# Patient Record
Sex: FEMALE | Race: ASIAN | NOT HISPANIC OR LATINO
[De-identification: names, ages, dates, MRNs, and addresses within clinical notes are randomized per-mention and may not be internally consistent; named-entity substitution may affect disease eponyms.]

---

## 2017-05-03 ENCOUNTER — APPOINTMENT (OUTPATIENT)
Dept: CARDIOLOGY | Facility: CLINIC | Age: 67
End: 2017-05-03

## 2017-05-03 ENCOUNTER — MEDICATION RENEWAL (OUTPATIENT)
Age: 67
End: 2017-05-03

## 2017-05-03 VITALS
WEIGHT: 112 LBS | TEMPERATURE: 98.2 F | BODY MASS INDEX: 20.61 KG/M2 | RESPIRATION RATE: 18 BRPM | DIASTOLIC BLOOD PRESSURE: 78 MMHG | OXYGEN SATURATION: 95 % | HEART RATE: 65 BPM | SYSTOLIC BLOOD PRESSURE: 117 MMHG

## 2017-06-23 ENCOUNTER — NON-APPOINTMENT (OUTPATIENT)
Age: 67
End: 2017-06-23

## 2017-06-23 ENCOUNTER — APPOINTMENT (OUTPATIENT)
Dept: CARDIOLOGY | Facility: CLINIC | Age: 67
End: 2017-06-23

## 2017-06-23 VITALS
SYSTOLIC BLOOD PRESSURE: 110 MMHG | RESPIRATION RATE: 17 BRPM | DIASTOLIC BLOOD PRESSURE: 69 MMHG | BODY MASS INDEX: 21.16 KG/M2 | WEIGHT: 115 LBS | HEART RATE: 65 BPM | HEIGHT: 61.81 IN | OXYGEN SATURATION: 96 % | TEMPERATURE: 98.9 F

## 2017-06-23 DIAGNOSIS — R07.89 OTHER CHEST PAIN: ICD-10-CM

## 2017-06-23 RX ORDER — ATORVASTATIN CALCIUM 10 MG/1
10 TABLET, FILM COATED ORAL
Qty: 90 | Refills: 0 | Status: COMPLETED | COMMUNITY
Start: 2017-05-12

## 2017-06-23 RX ORDER — RALOXIFENE HYDROCHLORIDE 60 MG/1
60 TABLET, FILM COATED ORAL
Qty: 90 | Refills: 0 | Status: COMPLETED | COMMUNITY
Start: 2017-06-19

## 2017-06-23 RX ORDER — CHLORHEXIDINE GLUCONATE, 0.12% ORAL RINSE 1.2 MG/ML
0.12 SOLUTION DENTAL
Qty: 473 | Refills: 0 | Status: COMPLETED | COMMUNITY
Start: 2017-01-11

## 2017-06-23 RX ORDER — CALCIUM CARBONATE/VITAMIN D3 600 MG-10
600-400 TABLET ORAL
Qty: 60 | Refills: 0 | Status: COMPLETED | COMMUNITY
Start: 2017-05-25

## 2017-06-23 RX ORDER — AMOXICILLIN 500 MG/1
500 CAPSULE ORAL
Qty: 30 | Refills: 0 | Status: COMPLETED | COMMUNITY
Start: 2017-04-18

## 2017-07-05 ENCOUNTER — MEDICATION RENEWAL (OUTPATIENT)
Age: 67
End: 2017-07-05

## 2017-10-12 ENCOUNTER — APPOINTMENT (OUTPATIENT)
Dept: CARDIOLOGY | Facility: CLINIC | Age: 67
End: 2017-10-12
Payer: MEDICARE

## 2017-10-12 VITALS
SYSTOLIC BLOOD PRESSURE: 96 MMHG | DIASTOLIC BLOOD PRESSURE: 64 MMHG | WEIGHT: 114 LBS | TEMPERATURE: 98.4 F | OXYGEN SATURATION: 100 % | RESPIRATION RATE: 17 BRPM | HEART RATE: 62 BPM | BODY MASS INDEX: 20.98 KG/M2

## 2017-10-12 PROCEDURE — 99214 OFFICE O/P EST MOD 30 MIN: CPT

## 2017-10-18 ENCOUNTER — APPOINTMENT (OUTPATIENT)
Dept: CARDIOLOGY | Facility: CLINIC | Age: 67
End: 2017-10-18

## 2018-01-12 ENCOUNTER — APPOINTMENT (OUTPATIENT)
Dept: CARDIOLOGY | Facility: CLINIC | Age: 68
End: 2018-01-12

## 2018-02-14 ENCOUNTER — APPOINTMENT (OUTPATIENT)
Dept: CARDIOLOGY | Facility: CLINIC | Age: 68
End: 2018-02-14
Payer: MEDICARE

## 2018-02-14 VITALS
WEIGHT: 114 LBS | SYSTOLIC BLOOD PRESSURE: 97 MMHG | OXYGEN SATURATION: 97 % | BODY MASS INDEX: 20.98 KG/M2 | HEART RATE: 78 BPM | RESPIRATION RATE: 17 BRPM | DIASTOLIC BLOOD PRESSURE: 66 MMHG | TEMPERATURE: 98 F

## 2018-02-14 PROCEDURE — 93015 CV STRESS TEST SUPVJ I&R: CPT

## 2018-02-14 PROCEDURE — 99214 OFFICE O/P EST MOD 30 MIN: CPT | Mod: 25

## 2018-02-14 PROCEDURE — 93306 TTE W/DOPPLER COMPLETE: CPT

## 2018-08-21 ENCOUNTER — RX RENEWAL (OUTPATIENT)
Age: 68
End: 2018-08-21

## 2018-08-28 ENCOUNTER — APPOINTMENT (OUTPATIENT)
Dept: CARDIOLOGY | Facility: CLINIC | Age: 68
End: 2018-08-28
Payer: MEDICARE

## 2018-08-28 VITALS
TEMPERATURE: 98 F | WEIGHT: 112 LBS | HEART RATE: 87 BPM | RESPIRATION RATE: 17 BRPM | DIASTOLIC BLOOD PRESSURE: 67 MMHG | OXYGEN SATURATION: 96 % | BODY MASS INDEX: 20.61 KG/M2 | SYSTOLIC BLOOD PRESSURE: 99 MMHG

## 2018-08-28 PROCEDURE — 99214 OFFICE O/P EST MOD 30 MIN: CPT

## 2019-02-28 ENCOUNTER — APPOINTMENT (OUTPATIENT)
Dept: CARDIOLOGY | Facility: CLINIC | Age: 69
End: 2019-02-28

## 2019-04-19 ENCOUNTER — APPOINTMENT (OUTPATIENT)
Dept: CARDIOLOGY | Facility: CLINIC | Age: 69
End: 2019-04-19
Payer: MEDICARE

## 2019-04-19 VITALS
SYSTOLIC BLOOD PRESSURE: 117 MMHG | OXYGEN SATURATION: 98 % | HEART RATE: 72 BPM | BODY MASS INDEX: 20.61 KG/M2 | RESPIRATION RATE: 18 BRPM | DIASTOLIC BLOOD PRESSURE: 75 MMHG | TEMPERATURE: 98 F | WEIGHT: 112 LBS

## 2019-04-19 PROCEDURE — 99214 OFFICE O/P EST MOD 30 MIN: CPT

## 2019-09-05 ENCOUNTER — APPOINTMENT (OUTPATIENT)
Dept: CARDIOLOGY | Facility: CLINIC | Age: 69
End: 2019-09-05
Payer: MEDICARE

## 2019-09-05 ENCOUNTER — NON-APPOINTMENT (OUTPATIENT)
Age: 69
End: 2019-09-05

## 2019-09-05 VITALS
BODY MASS INDEX: 20.98 KG/M2 | DIASTOLIC BLOOD PRESSURE: 70 MMHG | OXYGEN SATURATION: 96 % | TEMPERATURE: 98.8 F | RESPIRATION RATE: 17 BRPM | HEART RATE: 85 BPM | SYSTOLIC BLOOD PRESSURE: 110 MMHG | WEIGHT: 114 LBS

## 2019-09-05 PROCEDURE — 93000 ELECTROCARDIOGRAM COMPLETE: CPT

## 2019-09-05 PROCEDURE — 99214 OFFICE O/P EST MOD 30 MIN: CPT

## 2019-09-26 ENCOUNTER — NON-APPOINTMENT (OUTPATIENT)
Age: 69
End: 2019-09-26

## 2019-09-26 ENCOUNTER — APPOINTMENT (OUTPATIENT)
Dept: CARDIOLOGY | Facility: CLINIC | Age: 69
End: 2019-09-26
Payer: MEDICARE

## 2019-09-26 VITALS
SYSTOLIC BLOOD PRESSURE: 98 MMHG | BODY MASS INDEX: 20.61 KG/M2 | DIASTOLIC BLOOD PRESSURE: 63 MMHG | OXYGEN SATURATION: 96 % | HEART RATE: 57 BPM | WEIGHT: 112 LBS | TEMPERATURE: 98.3 F | RESPIRATION RATE: 17 BRPM

## 2019-09-26 DIAGNOSIS — I48.92 UNSPECIFIED ATRIAL FLUTTER: ICD-10-CM

## 2019-09-26 PROCEDURE — 99213 OFFICE O/P EST LOW 20 MIN: CPT

## 2019-10-30 ENCOUNTER — APPOINTMENT (OUTPATIENT)
Dept: HEART AND VASCULAR | Facility: CLINIC | Age: 69
End: 2019-10-30
Payer: MEDICARE

## 2019-10-30 ENCOUNTER — NON-APPOINTMENT (OUTPATIENT)
Age: 69
End: 2019-10-30

## 2019-10-30 VITALS
BODY MASS INDEX: 20.61 KG/M2 | DIASTOLIC BLOOD PRESSURE: 56 MMHG | SYSTOLIC BLOOD PRESSURE: 110 MMHG | WEIGHT: 112 LBS | HEIGHT: 62 IN | HEART RATE: 71 BPM

## 2019-10-30 PROCEDURE — 93000 ELECTROCARDIOGRAM COMPLETE: CPT

## 2019-10-30 PROCEDURE — 99205 OFFICE O/P NEW HI 60 MIN: CPT | Mod: 25

## 2019-10-30 RX ORDER — PROPRANOLOL HYDROCHLORIDE 10 MG/1
10 TABLET ORAL DAILY
Qty: 90 | Refills: 1 | Status: DISCONTINUED | COMMUNITY
Start: 2017-05-03 | End: 2019-10-30

## 2019-10-31 ENCOUNTER — FORM ENCOUNTER (OUTPATIENT)
Age: 69
End: 2019-10-31

## 2019-11-01 ENCOUNTER — APPOINTMENT (OUTPATIENT)
Dept: CT IMAGING | Facility: HOSPITAL | Age: 69
End: 2019-11-01

## 2019-11-01 ENCOUNTER — OUTPATIENT (OUTPATIENT)
Dept: OUTPATIENT SERVICES | Facility: HOSPITAL | Age: 69
LOS: 1 days | Discharge: HOME | End: 2019-11-01
Payer: MEDICARE

## 2019-11-01 LAB
ALBUMIN SERPL ELPH-MCNC: 4.4 G/DL — SIGNIFICANT CHANGE UP (ref 3.3–5)
ALP SERPL-CCNC: 59 U/L — SIGNIFICANT CHANGE UP (ref 40–120)
ALT FLD-CCNC: 21 U/L — SIGNIFICANT CHANGE UP (ref 10–45)
ANION GAP SERPL CALC-SCNC: 11 MMOL/L — SIGNIFICANT CHANGE UP (ref 5–17)
APTT BLD: 57.9 SEC — HIGH (ref 27.5–36.3)
AST SERPL-CCNC: 21 U/L — SIGNIFICANT CHANGE UP (ref 10–40)
BILIRUB SERPL-MCNC: 0.8 MG/DL — SIGNIFICANT CHANGE UP (ref 0.2–1.2)
BUN SERPL-MCNC: 16 MG/DL — SIGNIFICANT CHANGE UP (ref 7–23)
CALCIUM SERPL-MCNC: 9.3 MG/DL — SIGNIFICANT CHANGE UP (ref 8.4–10.5)
CHLORIDE SERPL-SCNC: 105 MMOL/L — SIGNIFICANT CHANGE UP (ref 96–108)
CO2 SERPL-SCNC: 25 MMOL/L — SIGNIFICANT CHANGE UP (ref 22–31)
CREAT SERPL-MCNC: 0.69 MG/DL — SIGNIFICANT CHANGE UP (ref 0.5–1.3)
GLUCOSE SERPL-MCNC: 95 MG/DL — SIGNIFICANT CHANGE UP (ref 70–99)
HCT VFR BLD CALC: 43.4 % — SIGNIFICANT CHANGE UP (ref 34.5–45)
HGB BLD-MCNC: 14 G/DL — SIGNIFICANT CHANGE UP (ref 11.5–15.5)
INR BLD: 1.53 — HIGH (ref 0.88–1.16)
MCHC RBC-ENTMCNC: 29.1 PG — SIGNIFICANT CHANGE UP (ref 27–34)
MCHC RBC-ENTMCNC: 32.3 GM/DL — SIGNIFICANT CHANGE UP (ref 32–36)
MCV RBC AUTO: 90.2 FL — SIGNIFICANT CHANGE UP (ref 80–100)
NRBC # BLD: 0 /100 WBCS — SIGNIFICANT CHANGE UP (ref 0–0)
PLATELET # BLD AUTO: 243 K/UL — SIGNIFICANT CHANGE UP (ref 150–400)
POTASSIUM SERPL-MCNC: 4.3 MMOL/L — SIGNIFICANT CHANGE UP (ref 3.5–5.3)
POTASSIUM SERPL-SCNC: 4.3 MMOL/L — SIGNIFICANT CHANGE UP (ref 3.5–5.3)
PROT SERPL-MCNC: 8 G/DL — SIGNIFICANT CHANGE UP (ref 6–8.3)
PROTHROM AB SERPL-ACNC: 17.5 SEC — HIGH (ref 10–12.9)
RBC # BLD: 4.81 M/UL — SIGNIFICANT CHANGE UP (ref 3.8–5.2)
RBC # FLD: 13.2 % — SIGNIFICANT CHANGE UP (ref 10.3–14.5)
SODIUM SERPL-SCNC: 141 MMOL/L — SIGNIFICANT CHANGE UP (ref 135–145)
WBC # BLD: 10.71 K/UL — HIGH (ref 3.8–10.5)
WBC # FLD AUTO: 10.71 K/UL — HIGH (ref 3.8–10.5)

## 2019-11-01 PROCEDURE — 80053 COMPREHEN METABOLIC PANEL: CPT

## 2019-11-01 PROCEDURE — 93312 ECHO TRANSESOPHAGEAL: CPT

## 2019-11-01 PROCEDURE — 92960 CARDIOVERSION ELECTRIC EXT: CPT

## 2019-11-01 PROCEDURE — 93320 DOPPLER ECHO COMPLETE: CPT | Mod: 26

## 2019-11-01 PROCEDURE — 85027 COMPLETE CBC AUTOMATED: CPT

## 2019-11-01 PROCEDURE — 93325 DOPPLER ECHO COLOR FLOW MAPG: CPT | Mod: 26

## 2019-11-01 PROCEDURE — 85730 THROMBOPLASTIN TIME PARTIAL: CPT

## 2019-11-01 PROCEDURE — 75572 CT HRT W/3D IMAGE: CPT

## 2019-11-01 PROCEDURE — 85610 PROTHROMBIN TIME: CPT

## 2019-11-01 PROCEDURE — 93306 TTE W/DOPPLER COMPLETE: CPT

## 2019-11-01 PROCEDURE — 93306 TTE W/DOPPLER COMPLETE: CPT | Mod: 26

## 2019-11-01 PROCEDURE — 76376 3D RENDER W/INTRP POSTPROCES: CPT | Mod: 26

## 2019-11-01 PROCEDURE — 75572 CT HRT W/3D IMAGE: CPT | Mod: 26

## 2019-11-01 PROCEDURE — 93312 ECHO TRANSESOPHAGEAL: CPT | Mod: 26

## 2019-11-05 ENCOUNTER — APPOINTMENT (OUTPATIENT)
Dept: CARDIOLOGY | Facility: CLINIC | Age: 69
End: 2019-11-05
Payer: MEDICARE

## 2019-11-05 ENCOUNTER — NON-APPOINTMENT (OUTPATIENT)
Age: 69
End: 2019-11-05

## 2019-11-05 VITALS
TEMPERATURE: 97.8 F | DIASTOLIC BLOOD PRESSURE: 79 MMHG | SYSTOLIC BLOOD PRESSURE: 120 MMHG | OXYGEN SATURATION: 96 % | RESPIRATION RATE: 17 BRPM | WEIGHT: 108 LBS | HEART RATE: 77 BPM | BODY MASS INDEX: 19.75 KG/M2

## 2019-11-05 PROCEDURE — 99213 OFFICE O/P EST LOW 20 MIN: CPT

## 2019-11-06 NOTE — DISCUSSION/SUMMARY
[FreeTextEntry1] : Ms. Gómez is a 69 year old female with atrial fibrillation (failed Multaq), who presents for initial evaluation.  WE discussed in great detail what atrial fibrillation is, the association with stroke and the natural progression of this arrhythmia.  She has symptoms from atrial fibrillation and is currently in afib despite being on Multaq.  Unclear if she is on low dose or full dose eliquis but we discussed she should be on 5mg BID.  I have recommended she proceeds with a cardioversion.  She will need a GABBY prior if she is not on 5mg BID and the dose will need to be adjusted.  Post cardioversion we will get a CT EP planning.  We discussed the option of long term arrhythmia suppression with an alternative antiarrhythmic medication vs. an ablation.  We discussed what an ablation is including risks, benefits and alternatives.   I have quoted her an approximately 70% chance for success and a 1:700 chance of major complication related to the procedure.  Risks including, but not limited to; infection, vascular injury, cardiac perforation, TE/CVA, phrenic nerve injury were discussed.  All questions answered.  We will see how she feels post cardioversion, but I suspect she would benefit from an ablation.  She will call us regarding her Eliquis dosing and we will plan for cardioversion +/- GABBY.  She knows to call with any questions or concerns.  \par

## 2019-11-06 NOTE — REVIEW OF SYSTEMS
[Fever] : no fever [Chills] : no chills [Feeling Fatigued] : not feeling fatigued [see HPI] : see HPI [Shortness Of Breath] : no shortness of breath [Chest Pain] : no chest pain [Negative] : Heme/Lymph

## 2019-11-06 NOTE — HISTORY OF PRESENT ILLNESS
[FreeTextEntry1] : Ms. Gómez is a 69 year old female with atrial fibrillation (failed Multaq), who presents for initial evaluation.\par \par She has had paroxysmal atrial fibrillation for a few years.  She was started on Multaq but recently had palpitations and increased fatigue.  She is unclear what dose of Eliquis she is on but our records say 2.5mg.  No chest pain, SOB, syncope, near syncope.  She has never had a cardioversion or an ablation.

## 2019-11-06 NOTE — PHYSICAL EXAM
[General Appearance - Well Developed] : well developed [Normal Appearance] : normal appearance [Well Groomed] : well groomed [General Appearance - Well Nourished] : well nourished [No Deformities] : no deformities [General Appearance - In No Acute Distress] : no acute distress [Normal Conjunctiva] : the conjunctiva exhibited no abnormalities [Normal Oral Mucosa] : normal oral mucosa [Normal Jugular Venous V Waves Present] : normal jugular venous V waves present [5th Left ICS - MCL] : palpated at the 5th LICS in the midclavicular line [Normal] : normal [Normal Rate] : normal [Irregularly Irregular] : irregularly irregular [No Murmur] : no murmurs heard [No Pitting Edema] : no pitting edema present [] : no respiratory distress [Respiration, Rhythm And Depth] : normal respiratory rhythm and effort [Exaggerated Use Of Accessory Muscles For Inspiration] : no accessory muscle use [Auscultation Breath Sounds / Voice Sounds] : lungs were clear to auscultation bilaterally [Abnormal Walk] : normal gait [Cyanosis, Localized] : no localized cyanosis [Skin Color & Pigmentation] : normal skin color and pigmentation [Oriented To Time, Place, And Person] : oriented to person, place, and time [Impaired Insight] : insight and judgment were intact [Affect] : the affect was normal [Mood] : the mood was normal [No Anxiety] : not feeling anxious

## 2019-11-26 ENCOUNTER — INPATIENT (INPATIENT)
Facility: HOSPITAL | Age: 69
LOS: 0 days | Discharge: HOME | DRG: 274 | End: 2019-11-27
Attending: INTERNAL MEDICINE | Admitting: INTERNAL MEDICINE
Payer: MEDICARE

## 2019-11-26 VITALS
OXYGEN SATURATION: 94 % | DIASTOLIC BLOOD PRESSURE: 67 MMHG | RESPIRATION RATE: 5 BRPM | SYSTOLIC BLOOD PRESSURE: 113 MMHG | HEART RATE: 88 BPM

## 2019-11-26 DIAGNOSIS — R63.8 OTHER SYMPTOMS AND SIGNS CONCERNING FOOD AND FLUID INTAKE: ICD-10-CM

## 2019-11-26 DIAGNOSIS — I48.91 UNSPECIFIED ATRIAL FIBRILLATION: ICD-10-CM

## 2019-11-26 LAB
ALBUMIN SERPL ELPH-MCNC: 4.9 G/DL — SIGNIFICANT CHANGE UP (ref 3.3–5)
ALP SERPL-CCNC: 55 U/L — SIGNIFICANT CHANGE UP (ref 40–120)
ALT FLD-CCNC: 18 U/L — SIGNIFICANT CHANGE UP (ref 10–45)
ANION GAP SERPL CALC-SCNC: 9 MMOL/L — SIGNIFICANT CHANGE UP (ref 5–17)
APTT BLD: 59.7 SEC — HIGH (ref 27.5–36.3)
AST SERPL-CCNC: 22 U/L — SIGNIFICANT CHANGE UP (ref 10–40)
BILIRUB SERPL-MCNC: 0.8 MG/DL — SIGNIFICANT CHANGE UP (ref 0.2–1.2)
BLD GP AB SCN SERPL QL: NEGATIVE — SIGNIFICANT CHANGE UP
BUN SERPL-MCNC: 12 MG/DL — SIGNIFICANT CHANGE UP (ref 7–23)
CALCIUM SERPL-MCNC: 9 MG/DL — SIGNIFICANT CHANGE UP (ref 8.4–10.5)
CHLORIDE SERPL-SCNC: 107 MMOL/L — SIGNIFICANT CHANGE UP (ref 96–108)
CO2 SERPL-SCNC: 27 MMOL/L — SIGNIFICANT CHANGE UP (ref 22–31)
CREAT SERPL-MCNC: 0.71 MG/DL — SIGNIFICANT CHANGE UP (ref 0.5–1.3)
GLUCOSE SERPL-MCNC: 93 MG/DL — SIGNIFICANT CHANGE UP (ref 70–99)
HCT VFR BLD CALC: 47.1 % — HIGH (ref 34.5–45)
HGB BLD-MCNC: 14.4 G/DL — SIGNIFICANT CHANGE UP (ref 11.5–15.5)
INR BLD: 1.09 — SIGNIFICANT CHANGE UP (ref 0.88–1.16)
MAGNESIUM SERPL-MCNC: 2.4 MG/DL — SIGNIFICANT CHANGE UP (ref 1.6–2.6)
MCHC RBC-ENTMCNC: 28.7 PG — SIGNIFICANT CHANGE UP (ref 27–34)
MCHC RBC-ENTMCNC: 30.6 GM/DL — LOW (ref 32–36)
MCV RBC AUTO: 93.8 FL — SIGNIFICANT CHANGE UP (ref 80–100)
NRBC # BLD: 0 /100 WBCS — SIGNIFICANT CHANGE UP (ref 0–0)
PLATELET # BLD AUTO: 253 K/UL — SIGNIFICANT CHANGE UP (ref 150–400)
POTASSIUM SERPL-MCNC: 4.7 MMOL/L — SIGNIFICANT CHANGE UP (ref 3.5–5.3)
POTASSIUM SERPL-SCNC: 4.7 MMOL/L — SIGNIFICANT CHANGE UP (ref 3.5–5.3)
PROT SERPL-MCNC: 8.2 G/DL — SIGNIFICANT CHANGE UP (ref 6–8.3)
PROTHROM AB SERPL-ACNC: 12.4 SEC — SIGNIFICANT CHANGE UP (ref 10–12.9)
RBC # BLD: 5.02 M/UL — SIGNIFICANT CHANGE UP (ref 3.8–5.2)
RBC # FLD: 13.6 % — SIGNIFICANT CHANGE UP (ref 10.3–14.5)
RH IG SCN BLD-IMP: POSITIVE — SIGNIFICANT CHANGE UP
SODIUM SERPL-SCNC: 143 MMOL/L — SIGNIFICANT CHANGE UP (ref 135–145)
WBC # BLD: 6.07 K/UL — SIGNIFICANT CHANGE UP (ref 3.8–10.5)
WBC # FLD AUTO: 6.07 K/UL — SIGNIFICANT CHANGE UP (ref 3.8–10.5)

## 2019-11-26 PROCEDURE — 93623 PRGRMD STIMJ&PACG IV RX NFS: CPT | Mod: 26

## 2019-11-26 PROCEDURE — 93613 INTRACARDIAC EPHYS 3D MAPG: CPT

## 2019-11-26 PROCEDURE — 93662 INTRACARDIAC ECG (ICE): CPT | Mod: 26

## 2019-11-26 PROCEDURE — 93656 COMPRE EP EVAL ABLTJ ATR FIB: CPT

## 2019-11-26 RX ORDER — APIXABAN 2.5 MG/1
5 TABLET, FILM COATED ORAL
Refills: 0 | Status: DISCONTINUED | OUTPATIENT
Start: 2019-11-26 | End: 2019-11-27

## 2019-11-26 RX ORDER — APIXABAN 2.5 MG/1
1 TABLET, FILM COATED ORAL
Qty: 0 | Refills: 0 | DISCHARGE

## 2019-11-26 RX ORDER — DRONEDARONE 400 MG/1
1 TABLET, FILM COATED ORAL
Qty: 0 | Refills: 0 | DISCHARGE

## 2019-11-26 RX ORDER — COLCHICINE 0.6 MG
0.6 TABLET ORAL DAILY
Refills: 0 | Status: DISCONTINUED | OUTPATIENT
Start: 2019-11-26 | End: 2019-11-27

## 2019-11-26 RX ORDER — METOPROLOL TARTRATE 50 MG
1 TABLET ORAL
Qty: 0 | Refills: 0 | DISCHARGE

## 2019-11-26 RX ORDER — SUCRALFATE 1 G
1 TABLET ORAL
Refills: 0 | Status: DISCONTINUED | OUTPATIENT
Start: 2019-11-26 | End: 2019-11-27

## 2019-11-26 RX ORDER — ACETAMINOPHEN 500 MG
650 TABLET ORAL ONCE
Refills: 0 | Status: DISCONTINUED | OUTPATIENT
Start: 2019-11-26 | End: 2019-11-27

## 2019-11-26 RX ORDER — METOPROLOL TARTRATE 50 MG
25 TABLET ORAL ONCE
Refills: 0 | Status: DISCONTINUED | OUTPATIENT
Start: 2019-11-26 | End: 2019-11-27

## 2019-11-26 NOTE — H&P ADULT - NSHPLABSRESULTS_GEN_ALL_CORE
EK19 afib with a ventricular rate of 57     Echo: 2018, normal LV function, no pulmonary hypertension, normal LA size, mild mitral regurgitation LVEF 70%.     TTE 2019    1. Normal left and right ventricular s   2. Mildly dilated left atrium.   3. Mildly dilated right atrium.    4. Mild tricuspid regurgitation.    5. Mild pulmonary hypertension, PASP is 41.7 mmHg.   6. No pericardial effusion.

## 2019-11-26 NOTE — H&P ADULT - HISTORY OF PRESENT ILLNESS
Ms. Gómez is a 69-year-old female PMHx atrial fibrillation (failed Multaq), who presents for ablation of atrial fibrillation.     Briefly, she has had paroxysmal atrial fibrillation for a few years. She was started on Multaq but recently had palpitations and increased fatigue. She is on 5mg Eliquis. No chest pain, SOB, syncope, near syncope. EK19 afib with a ventricular rate of 57 Echo: 2018, normal LV function, no pulmonary hypertension, normal LA size, mild mitral regurgitation LVEF 70%.     She reports no changes in her clinical statis since follow up visit and has continued medication regimen as prescribed. She is stable.

## 2019-11-26 NOTE — H&P ADULT - ASSESSMENT
69-year-old female PMHx atrial fibrillation (failed Multaq), who presents for ablation of atrial fibrillation. Currently stable.

## 2019-11-26 NOTE — PROGRESS NOTE ADULT - SUBJECTIVE AND OBJECTIVE BOX
EPS Post-Procedure Note    S/p successful pulmonary vein isolation for atrial fibrillation  RF Balloon  Bilateral femoral vein access  General anesthesia  Patient tolerated procedure well without complication    Plan:  - Bedrest x 6 hours  - Resume home dose of AC 6 hours post-procedure  - Groin checks as per protocol  - Likely discharge home tomorrow

## 2019-11-26 NOTE — H&P ADULT - PROBLEM SELECTOR PLAN 1
s/p balloon ablation  -post procedure groin checks  -place diet once on floor  -bedrest 6 hours post  -continue Eliquis 11pm.  -continue Metoprolol   -discharge TTE (ordered for Wednesday)

## 2019-11-26 NOTE — H&P ADULT - NSHPPHYSICALEXAM_GEN_ALL_CORE
Awake, talkative, in NAD  AXOX3, follows commands, appropriate  Neck supple, no JVD noted  PEERL, nasal/buccal mucosa moist and well perfused  BS clear bilaterally, unlabored, symmetrical  S1/S2, no S3, RRR, no M/G/R noted  Abdomen soft, non tender, non distended  No edema noted, perfusion brisk  Pulses palpable throughout  Skin warm and dry, no rashes/lesions noted

## 2019-11-26 NOTE — H&P ADULT - NSHPREVIEWOFSYSTEMS_GEN_ALL_CORE
GENERAL, CONSTITUTIONAL : denies recent weight loss, fever, chills  EYES, VISION: denies changes in vision   EARS, NOSE, THROAT: denies hearing loss  HEART, CARDIOVASCULAR: denies chest pain, arrhythmia, palpitations, SOB,  LE edema, claudication  RESPIRATORY: Denies cough, SOB, wheezing, PND, orthopnea  GASTROINTESTINAL: Denies abdominal pain, epigastric pain, nausea, vomiting, or hematemesis, diarrhea, constipation, melena or hematochezia.  GENITOURINARY: Denies frequent urination, urgency  MUSCULOSKELETAL denies joint pain or swelling, restricted motion, musculoskeletal pain.   SKIN & INTEGUMENTARY Denies rashes, sores, blisters, blisters, growths.  NEUROLOGICAL: Denies numbness or tingling sensations, sensation loss, burning.   PSYCHIATRIC: Denies nervousness, anxiety, depression  ENDOCRINE Denies heat or cold intolerance, excessive thirst  HEMATOLOGIC/LYMPHATIC: Denies abnormal bleeding, bleeding of any kind

## 2019-11-27 ENCOUNTER — TRANSCRIPTION ENCOUNTER (OUTPATIENT)
Age: 69
End: 2019-11-27

## 2019-11-27 VITALS — TEMPERATURE: 98 F

## 2019-11-27 LAB
EXTRA GREEN TOP TUBE: SIGNIFICANT CHANGE UP
EXTRA LAVENDER TOP TUBE: SIGNIFICANT CHANGE UP
HCV AB S/CO SERPL IA: 0.21 S/CO — SIGNIFICANT CHANGE UP
HCV AB SERPL-IMP: SIGNIFICANT CHANGE UP

## 2019-11-27 PROCEDURE — 99238 HOSP IP/OBS DSCHRG MGMT 30/<: CPT

## 2019-11-27 PROCEDURE — 93306 TTE W/DOPPLER COMPLETE: CPT | Mod: 26

## 2019-11-27 RX ORDER — COLCHICINE 0.6 MG
1 TABLET ORAL
Qty: 7 | Refills: 0
Start: 2019-11-27 | End: 2019-12-03

## 2019-11-27 RX ORDER — SUCRALFATE 1 G
1 TABLET ORAL
Qty: 30 | Refills: 0
Start: 2019-11-27 | End: 2019-12-26

## 2019-11-27 RX ORDER — PANTOPRAZOLE SODIUM 20 MG/1
1 TABLET, DELAYED RELEASE ORAL
Qty: 30 | Refills: 0
Start: 2019-11-27 | End: 2019-12-26

## 2019-11-27 RX ADMIN — Medication 1 GRAM(S): at 00:30

## 2019-11-27 RX ADMIN — Medication 1 GRAM(S): at 06:02

## 2019-11-27 RX ADMIN — APIXABAN 5 MILLIGRAM(S): 2.5 TABLET, FILM COATED ORAL at 00:30

## 2019-11-27 NOTE — DISCHARGE NOTE NURSING/CASE MANAGEMENT/SOCIAL WORK - NSDCFUADDAPPT_GEN_ALL_CORE_FT
Please call the office in [place] to make a follow up appointment with Dr. Negro:    [phone] [place] [id number]  Tel [phone]

## 2019-11-27 NOTE — DISCHARGE NOTE PROVIDER - CARE PROVIDER_API CALL
Garrison Negro)  Cardiac Electrophysiology; Cardiology; Cardiovascular Disease  [address], [address] [place] [address]  Phone: [phone]  Fax: [phone]  Follow Up Time:

## 2019-11-27 NOTE — DISCHARGE NOTE PROVIDER - HOSPITAL COURSE
Ms. Gómez is a 69-year-old female PMHx atrial fibrillation (failed Multaq), who presented for ablation of atrial fibrillation with RF balloon.         Briefly, she has had paroxysmal atrial fibrillation for a few years. She was started on Multaq but recently had palpitations and increased fatigue. She is on 5mg Eliquis. No chest pain, SOB, syncope, near syncope. EK19 afib with a ventricular rate of 57 Echo: 2018, normal LV function, no pulmonary hypertension, normal LA size, mild mitral regurgitation LVEF 70%.         She is s/p successful ablation. No acute events overnight. Cleared for discharge home today with her family.         Discharge Physical Exam:         TELEMETRY:        VITAL SIGNS:    T(C): 36.2 (19 @ 05:20), Max: 36.3 (19 @ 22:34)    HR: 78 (19 @ 05:08) (76 - 88)    BP: 101/60 (19 @ 05:08) (94/56 - 115/74)    RR: 17 (19 @ 05:08) (5 - 18)    SpO2: 96% (19 @ 05:08) (94% - 96%)        PHYSICAL EXAM:    Awake, talkative, in NAD    AXOX3, follows commands, appropriate    Neck supple, no JVD noted    PEERL, nasal/buccal mucosa moist and well perfused    BS clear bilaterally, unlabored, symmetrical    S1/S2, no S3, RRR, no M/G/R noted    Abdomen soft, non tender, non distended    No edema noted, perfusion brisk    Pulses palpable throughout    Skin warm and dry, no rashes/lesions noted    R/L groin C/D/I, no hematoma/bleeding/oozing/bruit noted. Perfusion and sensation intact. Ms. Gómez is a 69-year-old female PMHx atrial fibrillation (failed Multaq), who presented for ablation of atrial fibrillation with RF balloon.         Briefly, she has had paroxysmal atrial fibrillation for a few years. She was started on Multaq but recently had palpitations and increased fatigue. She is on 5mg Eliquis. No chest pain, SOB, syncope, near syncope. EK19 afib with a ventricular rate of 57 Echo: 2018, normal LV function, no pulmonary hypertension, normal LA size, mild mitral regurgitation LVEF 70%.         She is s/p successful ablation. No acute events overnight. Cleared for discharge home today with her family.         Discharge Physical Exam:         TELEMETRY: NSR 70's        VITAL SIGNS:    T(C): 36.2 (19 @ 05:20), Max: 36.3 (19 @ 22:34)    HR: 78 (19 @ 05:08) (76 - 88)    BP: 101/60 (19 @ 05:08) (94/56 - 115/74)    RR: 17 (19 @ 05:08) (5 - 18)    SpO2: 96% (19 @ 05:08) (94% - 96%)        PHYSICAL EXAM:    Awake, talkative, in NAD    AXOX3, follows commands, appropriate    Neck supple, no JVD noted    PEERL, nasal/buccal mucosa moist and well perfused    BS clear bilaterally, unlabored, symmetrical    S1/S2, no S3, RRR, no M/G/R noted    Abdomen soft, non tender, non distended    No edema noted, perfusion brisk    Pulses palpable throughout    Skin warm and dry, no rashes/lesions noted    R/L groin C/D/I, no hematoma/bleeding/oozing/bruit noted. Perfusion and sensation intact.

## 2019-11-27 NOTE — DISCHARGE NOTE PROVIDER - NSDCCPCAREPLAN_GEN_ALL_CORE_FT
PRINCIPAL DISCHARGE DIAGNOSIS  Diagnosis: Atrial fibrillation, unspecified type  Assessment and Plan of Treatment: You had a successful ablation using a radiofrequency balloon. continue medications as prescribed. You are discharged with three extra medications to help prevent post-procedure inflammation. Take colchicine one a day for 1 week - stop if you experience loose stools. Take Carafate once a day in the morning on a completely empty stomach for 30 days, and protonix once a day for 30 days. Follow up with Dr. Negro as directed.

## 2019-11-27 NOTE — DISCHARGE NOTE PROVIDER - NSDCFUADDINST_GEN_ALL_CORE_FT
-Ok to shower today. Let the soapy water run over the puncture sites but do not scrub the area for a few days.   -Pat groins dry after showering  -You may drive in three days.   -Monitor groins for any swelling/bruising/tenderness/bleeding and call the office if this happens or you have any general concerns.

## 2019-11-27 NOTE — DISCHARGE NOTE NURSING/CASE MANAGEMENT/SOCIAL WORK - PATIENT PORTAL LINK FT
You can access the FollowMyHealth Patient Portal offered by [facility] by registering at the following website: [email or web address]. By joining [facility]’s FollowMyHealth portal, you will also be able to view your health information using other applications (apps) compatible with our system.

## 2019-11-27 NOTE — DISCHARGE NOTE PROVIDER - NSDCFUADDAPPT_GEN_ALL_CORE_FT
Your follow up appointment with Dr. Negro is on Please call the office in [place] to make a follow up appointment with Dr. Negro:    [phone] [place] [phone]  Tel [phone]

## 2019-11-27 NOTE — DISCHARGE NOTE PROVIDER - INSTRUCTIONS
Please notify Niranjan Olivares We recommend eating a diet of fresh vegetables and fruits, lean meats, whole grains, and water for hydration. Cook with olive or canola oil as they are most heart healthy and avoid excessive use of butters/creams/saturated fats. Limit red meat to once a week.    Limit salt intake on a daily basis. Salt is hidden in many foods including fast food, processed foods, and deli foods and deli meats.

## 2019-11-27 NOTE — DISCHARGE NOTE PROVIDER - NSDCMRMEDTOKEN_GEN_ALL_CORE_FT
colchicine 0.6 mg oral tablet: 1 tab(s) orally once a day  Eliquis 5 mg oral tablet: 1 tab(s) orally 2 times a day  metoprolol succinate 25 mg oral tablet, extended release: 1 tab(s) orally once a day  Multaq 400 mg oral tablet: 1 tab(s) orally 2 times a day  Protonix 40 mg oral delayed release tablet: 1 tab(s) orally once a day   sucralfate 1 g oral tablet: 1 tab(s) orally once a day

## 2019-12-02 PROBLEM — I48.91 UNSPECIFIED ATRIAL FIBRILLATION: Chronic | Status: ACTIVE | Noted: 2019-11-26

## 2019-12-04 DIAGNOSIS — Z79.01 LONG TERM (CURRENT) USE OF ANTICOAGULANTS: ICD-10-CM

## 2019-12-04 DIAGNOSIS — I27.20 PULMONARY HYPERTENSION, UNSPECIFIED: ICD-10-CM

## 2019-12-04 DIAGNOSIS — I48.0 PAROXYSMAL ATRIAL FIBRILLATION: ICD-10-CM

## 2019-12-04 DIAGNOSIS — Z82.49 FAMILY HISTORY OF ISCHEMIC HEART DISEASE AND OTHER DISEASES OF THE CIRCULATORY SYSTEM: ICD-10-CM

## 2019-12-06 ENCOUNTER — APPOINTMENT (OUTPATIENT)
Dept: CARDIOLOGY | Facility: CLINIC | Age: 69
End: 2019-12-06
Payer: MEDICARE

## 2019-12-06 VITALS
TEMPERATURE: 98.1 F | DIASTOLIC BLOOD PRESSURE: 77 MMHG | RESPIRATION RATE: 18 BRPM | SYSTOLIC BLOOD PRESSURE: 121 MMHG | HEART RATE: 77 BPM | OXYGEN SATURATION: 96 % | BODY MASS INDEX: 20.12 KG/M2 | WEIGHT: 110 LBS

## 2019-12-06 PROCEDURE — 99213 OFFICE O/P EST LOW 20 MIN: CPT

## 2019-12-06 NOTE — PHYSICAL EXAM
[General Appearance - Well Developed] : well developed [Normal Appearance] : normal appearance [Well Groomed] : well groomed [General Appearance - Well Nourished] : well nourished [No Deformities] : no deformities [General Appearance - In No Acute Distress] : no acute distress [Normal Conjunctiva] : the conjunctiva exhibited no abnormalities [Eyelids - No Xanthelasma] : the eyelids demonstrated no xanthelasmas [Normal Oral Mucosa] : normal oral mucosa [No Oral Pallor] : no oral pallor [No Oral Cyanosis] : no oral cyanosis [Normal Jugular Venous A Waves Present] : normal jugular venous A waves present [Normal Jugular Venous V Waves Present] : normal jugular venous V waves present [No Jugular Venous Elizabeth A Waves] : no jugular venous elizabeth A waves [Respiration, Rhythm And Depth] : normal respiratory rhythm and effort [Exaggerated Use Of Accessory Muscles For Inspiration] : no accessory muscle use [Auscultation Breath Sounds / Voice Sounds] : lungs were clear to auscultation bilaterally [Heart Sounds] : normal S1 and S2 [Murmurs] : no murmurs present [Arterial Pulses Normal] : the arterial pulses were normal [Edema] : no peripheral edema present [Regular-Premature Beats] : the rhythm was regular with premature beats [Abdomen Soft] : soft [Abdomen Tenderness] : non-tender [Abdomen Mass (___ Cm)] : no abdominal mass palpated [Abnormal Walk] : normal gait [Gait - Sufficient For Exercise Testing] : the gait was sufficient for exercise testing [Nail Clubbing] : no clubbing of the fingernails [Cyanosis, Localized] : no localized cyanosis [Petechial Hemorrhages (___cm)] : no petechial hemorrhages [] : no ischemic changes [Oriented To Time, Place, And Person] : oriented to person, place, and time [Affect] : the affect was normal [Mood] : the mood was normal [No Anxiety] : not feeling anxious [Heart Rate And Rhythm] : heart rate and rhythm were normal

## 2019-12-06 NOTE — HISTORY OF PRESENT ILLNESS
[FreeTextEntry1] : 68-year-old female with PAF and HLD presents for followup.  Patient was last seen on 8/28/18.  Patient returned today for followup.  She is on Eliquis 2.5 mg BID for stroke prevention.  She takes Metoprolol ER 25 mg daily and Propafenone 225 mg BID for AFIB suppression. Patient reports that she has palpitations and when she was in [facility] the cardiologist there recommend that she get an ablation. She had Holter and Echo done while there. She reports that she feels fine on medication and her palpitations happens only when she feels nervous or under pressure. \par

## 2019-12-06 NOTE — PHYSICAL EXAM
[General Appearance - Well Developed] : well developed [Well Groomed] : well groomed [Normal Appearance] : normal appearance [General Appearance - Well Nourished] : well nourished [No Deformities] : no deformities [General Appearance - In No Acute Distress] : no acute distress [Normal Conjunctiva] : the conjunctiva exhibited no abnormalities [Normal Oral Mucosa] : normal oral mucosa [Eyelids - No Xanthelasma] : the eyelids demonstrated no xanthelasmas [No Oral Pallor] : no oral pallor [No Oral Cyanosis] : no oral cyanosis [Normal Jugular Venous A Waves Present] : normal jugular venous A waves present [Normal Jugular Venous V Waves Present] : normal jugular venous V waves present [No Jugular Venous Elizabeth A Waves] : no jugular venous elizabeth A waves [Respiration, Rhythm And Depth] : normal respiratory rhythm and effort [Exaggerated Use Of Accessory Muscles For Inspiration] : no accessory muscle use [Auscultation Breath Sounds / Voice Sounds] : lungs were clear to auscultation bilaterally [Heart Rate And Rhythm] : heart rate and rhythm were normal [Heart Sounds] : normal S1 and S2 [Murmurs] : no murmurs present [Arterial Pulses Normal] : the arterial pulses were normal [Edema] : no peripheral edema present [Regular-Premature Beats] : the rhythm was regular with premature beats [Abdomen Soft] : soft [Abdomen Tenderness] : non-tender [Abdomen Mass (___ Cm)] : no abdominal mass palpated [Abnormal Walk] : normal gait [Gait - Sufficient For Exercise Testing] : the gait was sufficient for exercise testing [Nail Clubbing] : no clubbing of the fingernails [Cyanosis, Localized] : no localized cyanosis [Petechial Hemorrhages (___cm)] : no petechial hemorrhages [] : no ischemic changes [Oriented To Time, Place, And Person] : oriented to person, place, and time [Affect] : the affect was normal [Mood] : the mood was normal [No Anxiety] : not feeling anxious

## 2019-12-06 NOTE — DISCUSSION/SUMMARY
[FreeTextEntry1] : 68-year-old female with PAF and HLD presents for followup.  Patient was last seen on 8/28/18.  Patient returned today for followup.  She is on Eliquis 2.5 mg BID for stroke prevention.  She takes Metoprolol ER 25 mg daily and Propafenone 225 mg BID for AFIB suppression. Patient reports that she has palpitations and when she was in [place] the cardiologist there recommend that she get an ablation. She had Holter and Echo done while there. She reports that she feels fine on medication and her palpitations happens only when she feels nervous or under pressure. \par  \par (1) Paroxysmal atrial flutter/fibrillation - Patient is mildly symptomatic.  Her [id number] score is 2 (age 65-75, female gender).  She should continue Eliquis 2.5 mg BID for stroke prevention.  She should continue Metoprolol ER 25 mg daily and Propafenone 225 mg BID AFIB suppression.  She can consider ablation or switching to another anti-arrhythmic drug if she remains symptomatic.\par \par (2) Mild CAD - Patient reports that she had a CTA in [place] which showed mild CAD (<50% stenosis).  She is asymptomatic.  She is on statin therapy.\par \par (3) Followup -  6 months.

## 2019-12-06 NOTE — PHYSICAL EXAM
[General Appearance - Well Developed] : well developed [Normal Appearance] : normal appearance [Well Groomed] : well groomed [General Appearance - Well Nourished] : well nourished [No Deformities] : no deformities [Normal Conjunctiva] : the conjunctiva exhibited no abnormalities [General Appearance - In No Acute Distress] : no acute distress [Eyelids - No Xanthelasma] : the eyelids demonstrated no xanthelasmas [Normal Oral Mucosa] : normal oral mucosa [No Oral Pallor] : no oral pallor [No Oral Cyanosis] : no oral cyanosis [Normal Jugular Venous A Waves Present] : normal jugular venous A waves present [Normal Jugular Venous V Waves Present] : normal jugular venous V waves present [No Jugular Venous Elizabeth A Waves] : no jugular venous elizabeth A waves [Respiration, Rhythm And Depth] : normal respiratory rhythm and effort [Auscultation Breath Sounds / Voice Sounds] : lungs were clear to auscultation bilaterally [Exaggerated Use Of Accessory Muscles For Inspiration] : no accessory muscle use [Murmurs] : no murmurs present [Heart Sounds] : normal S1 and S2 [Arterial Pulses Normal] : the arterial pulses were normal [Regular-Premature Beats] : the rhythm was regular with premature beats [Edema] : no peripheral edema present [Abdomen Tenderness] : non-tender [Abdomen Soft] : soft [Abdomen Mass (___ Cm)] : no abdominal mass palpated [Abnormal Walk] : normal gait [Nail Clubbing] : no clubbing of the fingernails [Gait - Sufficient For Exercise Testing] : the gait was sufficient for exercise testing [Cyanosis, Localized] : no localized cyanosis [Petechial Hemorrhages (___cm)] : no petechial hemorrhages [] : no ischemic changes [Oriented To Time, Place, And Person] : oriented to person, place, and time [Mood] : the mood was normal [Affect] : the affect was normal [No Anxiety] : not feeling anxious [Heart Rate And Rhythm] : heart rate and rhythm were normal

## 2019-12-06 NOTE — REASON FOR VISIT
[Follow-Up - Clinic] : a clinic follow-up of [Atrial Fibrillation] : atrial fibrillation [Dyspnea] : dyspnea [Palpitations] : palpitations

## 2019-12-06 NOTE — PHYSICAL EXAM
[General Appearance - Well Developed] : well developed [Normal Appearance] : normal appearance [Well Groomed] : well groomed [General Appearance - Well Nourished] : well nourished [No Deformities] : no deformities [General Appearance - In No Acute Distress] : no acute distress [Eyelids - No Xanthelasma] : the eyelids demonstrated no xanthelasmas [Normal Conjunctiva] : the conjunctiva exhibited no abnormalities [Normal Oral Mucosa] : normal oral mucosa [No Oral Pallor] : no oral pallor [No Oral Cyanosis] : no oral cyanosis [Normal Jugular Venous V Waves Present] : normal jugular venous V waves present [Normal Jugular Venous A Waves Present] : normal jugular venous A waves present [No Jugular Venous Elizabeth A Waves] : no jugular venous elizabeth A waves [Respiration, Rhythm And Depth] : normal respiratory rhythm and effort [Exaggerated Use Of Accessory Muscles For Inspiration] : no accessory muscle use [Heart Sounds] : normal S1 and S2 [Auscultation Breath Sounds / Voice Sounds] : lungs were clear to auscultation bilaterally [Arterial Pulses Normal] : the arterial pulses were normal [Edema] : no peripheral edema present [Murmurs] : no murmurs present [Regular-Premature Beats] : the rhythm was regular with premature beats [Abdomen Tenderness] : non-tender [Abdomen Soft] : soft [Abdomen Mass (___ Cm)] : no abdominal mass palpated [Abnormal Walk] : normal gait [Gait - Sufficient For Exercise Testing] : the gait was sufficient for exercise testing [Petechial Hemorrhages (___cm)] : no petechial hemorrhages [Nail Clubbing] : no clubbing of the fingernails [Cyanosis, Localized] : no localized cyanosis [] : no ischemic changes [Affect] : the affect was normal [Oriented To Time, Place, And Person] : oriented to person, place, and time [Mood] : the mood was normal [No Anxiety] : not feeling anxious [Heart Rate And Rhythm] : heart rate and rhythm were normal

## 2019-12-06 NOTE — DISCUSSION/SUMMARY
[FreeTextEntry1] : 68-year-old female with PAF and HLD presents for followup.  Patient was last seen on 4/19/19.  Patient returned today for followup.  She is on Eliquis 2.5 mg BID for stroke prevention.  She takes Metoprolol ER 25 mg daily and Propafenone 225 mg BID for AFIB suppression.  Patient reports that her palpitations have been more frequent in the last 2 weeks and would wake her up. She is willing to undergo ablation and will go to [facility]. \par  \par (1) Paroxysmal atrial flutter/fibrillation - Patient is in atrial flutter.  Her [id number] score is 2 (age 65-75, female gender).  She is willing to undergo ablation and would like to go to [facility].  I advised patient to switch Propafenone to Multaq 400 mg BID.  She should continue Eliquis 2.5 mg BID for stroke prevention.  She should continue Metoprolol ER 25 mg daily.\par \par (2) Mild CAD - Patient reports that she had a CTA in [facility] which showed mild CAD (<50% stenosis).  She is asymptomatic.  She is on statin therapy.\par \par (3) Followup -  pending ablation.

## 2019-12-06 NOTE — DISCUSSION/SUMMARY
[FreeTextEntry1] : 69-year-old female with PAF and HLD presents for followup.  Patient was last seen on 9/5/19 for atrial flutter.  She was advised to switch Propafenone to Multaq 400 mg BID and to undergo ablation.  She is on Eliquis 2.5 mg BID for stroke prevention.  She is on Metoprolol ER 25 mg daily.  Patient reports woken up by palpitations at 7 am lasting 30 minutes after starting Multaq 400 mg BID.  Patient denies CP.  Patient denies SOB.  Patient denies lightheadedness.  Patient denies bleeding issues on Eliquis.  Patient is planning to travel to [place] 10/5 and returning on 10/20. \par  \par (1) Paroxysmal atrial flutter/fibrillation - Patient remains symptomatic despite Multaq 400 mg BID.  She is waiting to see EP at [facility].  Her [id number] score is 2 (age 65-75, female gender).  She should continue Eliquis 2.5 mg BID for stroke prevention.  She should continue Metoprolol ER 25 mg daily.\par \par (2) Mild CAD - Patient reports that she had a CTA in [facility] which showed mild CAD (<50% stenosis).  She is asymptomatic.  She is on statin therapy.\par \par (3) Followup -  pending ablation.

## 2019-12-06 NOTE — HISTORY OF PRESENT ILLNESS
[FreeTextEntry1] : 68-year-old female with PAF and HLD presents for followup.  Patient was last seen on 4/19/19.  Patient returned today for followup.  She is on Eliquis 2.5 mg BID for stroke prevention.  She takes Metoprolol ER 25 mg daily and Propafenone 225 mg BID for AFIB suppression.  Patient reports that her palpitations have been more frequent in the last 2 weeks and would wake her up. She is willing to undergo ablation and will go to [facility].

## 2019-12-06 NOTE — HISTORY OF PRESENT ILLNESS
[FreeTextEntry1] : 69-year-old female with PAF and HLD presents for followup.  Patient was last seen on 9/5/19 for atrial flutter.  She was advised to switch Propafenone to Multaq 400 mg BID and to undergo ablation.  She is on Eliquis 2.5 mg BID for stroke prevention.  She is on Metoprolol ER 25 mg daily.  Patient reports woken up by palpitations at 7 am lasting 30 minutes after starting Multaq 400 mg BID.  Patient denies CP.  Patient denies SOB.  Patient denies lightheadedness.  Patient denies bleeding issues on Eliquis.  Patient is planning to travel to [place] 10/5 and returning on 10/20.

## 2019-12-06 NOTE — HISTORY OF PRESENT ILLNESS
[FreeTextEntry1] : 69-year-old female with PAF and HLD presents for followup.  Patient was last seen on 9/26/19.  Patient returned today for followup.  Patient saw POORNIMA FINK at [facility] and is s/p GABBY/CV. Patient underwent CTA and was found to have tree-in-bud appearance on CT.  Patient will be undergoing ablation.  Her Eliquis has been increased to 5 mg BID. Patient reports that she has been coughing and it is worse the last 2 days.

## 2019-12-06 NOTE — DISCUSSION/SUMMARY
[FreeTextEntry1] : 69-year-old female with PAF and HLD presents for followup.  Patient was last seen on 9/26/19.  Patient returned today for followup.  Patient saw POORNIMA FINK at [facility] and is s/p GABBY/CV. Patient underwent CTA and was found to have tree-in-bud appearance on CT.  Patient will be undergoing ablation.  Her Eliquis has been increased to 5 mg BID. Patient reports that she has been coughing and it is worse the last 2 days. \par  \par (1) Paroxysmal atrial flutter/fibrillation s/p GABBY and cardioversion - Patient is awaiting ablation at [facility].  Her [id number] score is 2 (age 65-75, female gender).  She should continue Eliquis 5 mg BID for stroke prevention.  She should continue Multaq 400 mg BID and Metoprolol ER 25 mg daily.\par \par (2) Cough, tree-in-bud on CT - I advised patient to see pulmonology for evaluation.\par \par (3) Mild CAD - Patient reports that she had a CTA in [facility] which showed mild CAD (<50% stenosis).  She is asymptomatic.  She is on statin therapy.\par \par (4) Followup -  pending ablation.

## 2019-12-19 PROCEDURE — 93306 TTE W/DOPPLER COMPLETE: CPT

## 2019-12-19 PROCEDURE — 85027 COMPLETE CBC AUTOMATED: CPT

## 2019-12-19 PROCEDURE — 83735 ASSAY OF MAGNESIUM: CPT

## 2019-12-19 PROCEDURE — C1732: CPT

## 2019-12-19 PROCEDURE — 36415 COLL VENOUS BLD VENIPUNCTURE: CPT

## 2019-12-19 PROCEDURE — 86803 HEPATITIS C AB TEST: CPT

## 2019-12-19 PROCEDURE — C1769: CPT

## 2019-12-19 PROCEDURE — 86850 RBC ANTIBODY SCREEN: CPT

## 2019-12-19 PROCEDURE — 86901 BLOOD TYPING SEROLOGIC RH(D): CPT

## 2019-12-19 PROCEDURE — 85730 THROMBOPLASTIN TIME PARTIAL: CPT

## 2019-12-19 PROCEDURE — C9399: CPT

## 2019-12-19 PROCEDURE — C1894: CPT

## 2019-12-19 PROCEDURE — C1893: CPT

## 2019-12-19 PROCEDURE — C1759: CPT

## 2019-12-19 PROCEDURE — 86900 BLOOD TYPING SEROLOGIC ABO: CPT

## 2019-12-19 PROCEDURE — 85610 PROTHROMBIN TIME: CPT

## 2019-12-19 PROCEDURE — 80053 COMPREHEN METABOLIC PANEL: CPT

## 2020-01-15 ENCOUNTER — APPOINTMENT (OUTPATIENT)
Dept: CARDIOLOGY | Facility: CLINIC | Age: 70
End: 2020-01-15

## 2020-01-23 ENCOUNTER — APPOINTMENT (OUTPATIENT)
Dept: HEART AND VASCULAR | Facility: CLINIC | Age: 70
End: 2020-01-23

## 2020-01-29 ENCOUNTER — APPOINTMENT (OUTPATIENT)
Dept: HEART AND VASCULAR | Facility: CLINIC | Age: 70
End: 2020-01-29
Payer: MEDICARE

## 2020-01-29 ENCOUNTER — NON-APPOINTMENT (OUTPATIENT)
Age: 70
End: 2020-01-29

## 2020-01-29 VITALS
BODY MASS INDEX: 20.8 KG/M2 | WEIGHT: 113 LBS | HEART RATE: 78 BPM | DIASTOLIC BLOOD PRESSURE: 69 MMHG | HEIGHT: 62 IN | SYSTOLIC BLOOD PRESSURE: 129 MMHG

## 2020-01-29 PROCEDURE — 99213 OFFICE O/P EST LOW 20 MIN: CPT | Mod: 25

## 2020-01-29 PROCEDURE — 93000 ELECTROCARDIOGRAM COMPLETE: CPT

## 2020-01-29 RX ORDER — SUCRALFATE 1 G/1
1 TABLET ORAL
Qty: 30 | Refills: 0 | Status: DISCONTINUED | COMMUNITY
Start: 2019-11-27

## 2020-01-29 RX ORDER — IBUPROFEN 600 MG/1
600 TABLET, FILM COATED ORAL
Qty: 15 | Refills: 0 | Status: DISCONTINUED | COMMUNITY
Start: 2020-01-14

## 2020-01-29 RX ORDER — OSELTAMIVIR PHOSPHATE 75 MG/1
75 CAPSULE ORAL
Qty: 10 | Refills: 0 | Status: DISCONTINUED | COMMUNITY
Start: 2020-01-14

## 2020-01-29 RX ORDER — PANTOPRAZOLE 40 MG/1
40 TABLET, DELAYED RELEASE ORAL
Qty: 30 | Refills: 0 | Status: DISCONTINUED | COMMUNITY
Start: 2019-11-27

## 2020-01-29 RX ORDER — APIXABAN 2.5 MG/1
2.5 TABLET, FILM COATED ORAL
Qty: 180 | Refills: 0 | Status: DISCONTINUED | COMMUNITY
Start: 2019-04-19

## 2020-01-29 RX ORDER — COLCHICINE 0.6 MG/1
0.6 TABLET ORAL
Qty: 7 | Refills: 0 | Status: DISCONTINUED | COMMUNITY
Start: 2019-11-27

## 2020-01-29 RX ORDER — DENOSUMAB 60 MG/ML
60 INJECTION SUBCUTANEOUS
Qty: 1 | Refills: 0 | Status: DISCONTINUED | COMMUNITY
Start: 2019-11-18

## 2020-01-29 RX ORDER — LEVOCETIRIZINE DIHYDROCHLORIDE 5 MG/1
5 TABLET ORAL
Qty: 5 | Refills: 0 | Status: DISCONTINUED | COMMUNITY
Start: 2020-01-14

## 2020-02-04 NOTE — CARDIOLOGY SUMMARY
[LVEF ___%] : LVEF [unfilled]% [___] : [unfilled] [None] : no pulmonary hypertension [Normal] : normal LA size [Mild] : mild mitral regurgitation [___] : [unfilled]

## 2020-02-04 NOTE — REVIEW OF SYSTEMS
[see HPI] : see HPI [Fever] : no fever [Chills] : no chills [Shortness Of Breath] : no shortness of breath [Feeling Fatigued] : not feeling fatigued [Negative] : Respiratory [Chest Pain] : no chest pain

## 2020-02-04 NOTE — REASON FOR VISIT
[Family Member] : family member [Follow-Up - Clinic] : a clinic follow-up of [Atrial Fibrillation] : atrial fibrillation

## 2020-02-04 NOTE — HISTORY OF PRESENT ILLNESS
[FreeTextEntry1] : Ms. Gómez is a 69 year old female with atrial fibrillation (failed Multaq), now s/p ablation 11/2019 who presents for follow up evaluation.\par \par She has had paroxysmal atrial fibrillation for a few years.  She was started on Multaq but had breakthrough palpitations and increased fatigue.  She ultimately underwent an ablation 11/2019.  She is doing well since then.  No palpitations, chest pain, SOB, syncope, near syncope.

## 2020-02-04 NOTE — PHYSICAL EXAM
[General Appearance - Well Developed] : well developed [Well Groomed] : well groomed [Normal Appearance] : normal appearance [General Appearance - Well Nourished] : well nourished [General Appearance - In No Acute Distress] : no acute distress [No Deformities] : no deformities [Normal Oral Mucosa] : normal oral mucosa [Normal Conjunctiva] : the conjunctiva exhibited no abnormalities [Normal Jugular Venous V Waves Present] : normal jugular venous V waves present [] : no respiratory distress [Respiration, Rhythm And Depth] : normal respiratory rhythm and effort [Exaggerated Use Of Accessory Muscles For Inspiration] : no accessory muscle use [Auscultation Breath Sounds / Voice Sounds] : lungs were clear to auscultation bilaterally [Abnormal Walk] : normal gait [Skin Color & Pigmentation] : normal skin color and pigmentation [Oriented To Time, Place, And Person] : oriented to person, place, and time [Impaired Insight] : insight and judgment were intact [Affect] : the affect was normal [Mood] : the mood was normal [No Anxiety] : not feeling anxious [5th Left ICS - MCL] : palpated at the 5th LICS in the midclavicular line [Normal] : normal [Normal Rate] : normal [No Murmur] : no murmurs heard [No Pitting Edema] : no pitting edema present [Rhythm Regular] : regular [Normal S1] : normal S1 [Normal S2] : normal S2 [S3] : no S3 [S4] : no S4

## 2020-02-04 NOTE — DISCUSSION/SUMMARY
[FreeTextEntry1] : Ms. Gómez is a 69 year old female with atrial fibrillation (failed Multaq), now s/p ablation 11/2019 who presents for follow up evaluation.  She is doing well post ablation with no symptoms concerning fo recurrent arrhythmia.  She is maintained on oral anticoagulation.  No changes were made today and in the future we will consider monitoring with a loop recorder or an event monoitor.  She will follow up in 6 months or sooner if needed and knows to call with any questions or concerns.

## 2020-02-23 NOTE — DISCUSSION/SUMMARY
[FreeTextEntry1] : 69-year-old female with PAF and HLD presents for followup.  Patient was last seen on 11/5/19 following GABBY and CV at [facility].  She was noted to have tree-in-bud appearance on CT.   I advised patient to see pulmonology for evaluation.  Patient reports that she underwent ablation last week at [facility] and is doing well.  Patient reports that her palpitations is better but still feels it occasionally at night during sleep.  Patient denies CP.  Patient denies SOB.  She is on Eliquis 5 mg BID and Multaq 400 mg BID. \par  \par (1) Paroxysmal atrial flutter/fibrillation s/p GABBY and cardioversion s/p ablation 11/26/19 at [facility] - Patient has been stable.  Her [id number] score is 2 (age 65-75, female gender).  She should continue Eliquis 5 mg BID for stroke prevention.  She should followup with EP regarding Multaq 400 mg BID.\par \par (2) Cough, tree-in-bud on CT - I advised patient to see pulmonology for evaluation.\par \par (3) Mild CAD - Patient reports that she had a CTA in [facility] which showed mild CAD (<50% stenosis).  She is asymptomatic.  She is on statin therapy.\par \par (4) Followup -  3 months.

## 2020-02-23 NOTE — HISTORY OF PRESENT ILLNESS
[FreeTextEntry1] : 69-year-old female with PAF and HLD presents for followup.  Patient was last seen on 11/5/19 following GABBY and CV at [facility].  She was noted to have tree-in-bud appearance on CT.   I advised patient to see pulmonology for evaluation.  Patient reports that she underwent ablation last week at [facility] and is doing well.  Patient reports that her palpitations is better but still feels it occasionally at night during sleep.  Patient denies CP.  Patient denies SOB.  She is on Eliquis 5 mg BID and Multaq 400 mg BID.

## 2020-02-23 NOTE — PHYSICAL EXAM
[Normal Appearance] : normal appearance [General Appearance - Well Developed] : well developed [General Appearance - Well Nourished] : well nourished [Well Groomed] : well groomed [No Deformities] : no deformities [General Appearance - In No Acute Distress] : no acute distress [Eyelids - No Xanthelasma] : the eyelids demonstrated no xanthelasmas [Normal Conjunctiva] : the conjunctiva exhibited no abnormalities [Normal Oral Mucosa] : normal oral mucosa [No Oral Cyanosis] : no oral cyanosis [No Oral Pallor] : no oral pallor [Normal Jugular Venous A Waves Present] : normal jugular venous A waves present [No Jugular Venous Elizabeth A Waves] : no jugular venous elizabeth A waves [Normal Jugular Venous V Waves Present] : normal jugular venous V waves present [Exaggerated Use Of Accessory Muscles For Inspiration] : no accessory muscle use [Respiration, Rhythm And Depth] : normal respiratory rhythm and effort [Heart Sounds] : normal S1 and S2 [Auscultation Breath Sounds / Voice Sounds] : lungs were clear to auscultation bilaterally [Heart Rate And Rhythm] : heart rate and rhythm were normal [Murmurs] : no murmurs present [Edema] : no peripheral edema present [Regular-Premature Beats] : the rhythm was regular with premature beats [Arterial Pulses Normal] : the arterial pulses were normal [Abdomen Soft] : soft [Abdomen Tenderness] : non-tender [Abdomen Mass (___ Cm)] : no abdominal mass palpated [Gait - Sufficient For Exercise Testing] : the gait was sufficient for exercise testing [Abnormal Walk] : normal gait [Nail Clubbing] : no clubbing of the fingernails [] : no ischemic changes [Petechial Hemorrhages (___cm)] : no petechial hemorrhages [Cyanosis, Localized] : no localized cyanosis [Oriented To Time, Place, And Person] : oriented to person, place, and time [Mood] : the mood was normal [Affect] : the affect was normal [No Anxiety] : not feeling anxious

## 2020-02-24 ENCOUNTER — APPOINTMENT (OUTPATIENT)
Dept: CARDIOLOGY | Facility: CLINIC | Age: 70
End: 2020-02-24
Payer: MEDICARE

## 2020-02-24 VITALS
DIASTOLIC BLOOD PRESSURE: 82 MMHG | RESPIRATION RATE: 17 BRPM | TEMPERATURE: 98 F | OXYGEN SATURATION: 97 % | BODY MASS INDEX: 19.57 KG/M2 | WEIGHT: 107 LBS | SYSTOLIC BLOOD PRESSURE: 127 MMHG | HEART RATE: 84 BPM

## 2020-02-24 PROCEDURE — 99214 OFFICE O/P EST MOD 30 MIN: CPT

## 2020-02-24 RX ORDER — DRONEDARONE 400 MG/1
400 TABLET, FILM COATED ORAL TWICE DAILY
Qty: 60 | Refills: 5 | Status: DISCONTINUED | COMMUNITY
Start: 2019-09-05 | End: 2020-02-24

## 2020-03-08 ENCOUNTER — FORM ENCOUNTER (OUTPATIENT)
Age: 70
End: 2020-03-08

## 2020-03-18 ENCOUNTER — APPOINTMENT (OUTPATIENT)
Dept: CARDIOLOGY | Facility: CLINIC | Age: 70
End: 2020-03-18

## 2020-08-08 NOTE — PHYSICAL EXAM
[General Appearance - Well Developed] : well developed [Normal Appearance] : normal appearance [Well Groomed] : well groomed [General Appearance - Well Nourished] : well nourished [No Deformities] : no deformities [General Appearance - In No Acute Distress] : no acute distress [Normal Conjunctiva] : the conjunctiva exhibited no abnormalities [Eyelids - No Xanthelasma] : the eyelids demonstrated no xanthelasmas [Normal Oral Mucosa] : normal oral mucosa [No Oral Pallor] : no oral pallor [No Oral Cyanosis] : no oral cyanosis [Normal Jugular Venous A Waves Present] : normal jugular venous A waves present [Normal Jugular Venous V Waves Present] : normal jugular venous V waves present [No Jugular Venous Elizabeth A Waves] : no jugular venous elizabeth A waves [Respiration, Rhythm And Depth] : normal respiratory rhythm and effort [Exaggerated Use Of Accessory Muscles For Inspiration] : no accessory muscle use [Auscultation Breath Sounds / Voice Sounds] : lungs were clear to auscultation bilaterally [Heart Rate And Rhythm] : heart rate and rhythm were normal [Heart Sounds] : normal S1 and S2 [Murmurs] : no murmurs present [Arterial Pulses Normal] : the arterial pulses were normal [Edema] : no peripheral edema present [Regular-Premature Beats] : the rhythm was regular with premature beats [Abdomen Soft] : soft [Abdomen Tenderness] : non-tender [Abdomen Mass (___ Cm)] : no abdominal mass palpated [Abnormal Walk] : normal gait [Gait - Sufficient For Exercise Testing] : the gait was sufficient for exercise testing [Nail Clubbing] : no clubbing of the fingernails [Cyanosis, Localized] : no localized cyanosis [Petechial Hemorrhages (___cm)] : no petechial hemorrhages [] : no ischemic changes [Oriented To Time, Place, And Person] : oriented to person, place, and time [Affect] : the affect was normal [Mood] : the mood was normal [No Anxiety] : not feeling anxious

## 2020-08-08 NOTE — DISCUSSION/SUMMARY
[FreeTextEntry1] : 69-year-old female with PAF s/p ablation 11/26/19 at [facility] and HLD presents for followup.  Patient was last seen on 12/6/19.  She is on Eliquis 5 mg BID and Metoprolol ER 25 mg.  She is off Multaq 400 mg BID.  \par \par Patient reports that she tends to wake up at 6 am from palpitations lasting 1 minute.  Patient denies palpitations at other times.  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   Patient wore a 7-day Ziopatch and it showed short runs of PAT (5 beats the longest). No PAF was noted.  Patient was reassured.  Her [id number] score is 2 (age 65-75, female gender).  I advised patient to continue Eliquis 5 mg BID for stroke prevention and Metoprolol ER 25 mg for now.  She is off Multaq 400 mg BID.\par \par (2) Mild CAD - Patient reports that she had a CTA in [facility] which showed mild CAD (<50% stenosis).  She is asymptomatic.  She is on statin therapy.\par \par (3) Followup -  6 months.

## 2020-08-08 NOTE — HISTORY OF PRESENT ILLNESS
[FreeTextEntry1] : 69-year-old female with PAF s/p ablation 11/26/19 at [facility] and D presents for followup.  Patient was last seen on 12/6/19.  She is on Eliquis 5 mg BID and Metoprolol ER 25 mg.  She is off Multaq 400 mg BID.  \par \par Patient reports that she tends to wake up at 6 am from palpitations lasting 1 minute.  Patient denies palpitations at other times.  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

## 2020-08-10 ENCOUNTER — APPOINTMENT (OUTPATIENT)
Dept: CARDIOLOGY | Facility: CLINIC | Age: 70
End: 2020-08-10
Payer: MEDICARE

## 2020-08-10 VITALS
BODY MASS INDEX: 20.49 KG/M2 | WEIGHT: 112 LBS | HEART RATE: 80 BPM | SYSTOLIC BLOOD PRESSURE: 127 MMHG | OXYGEN SATURATION: 94 % | RESPIRATION RATE: 17 BRPM | DIASTOLIC BLOOD PRESSURE: 80 MMHG | TEMPERATURE: 97.4 F

## 2020-08-10 PROCEDURE — 99214 OFFICE O/P EST MOD 30 MIN: CPT

## 2020-08-18 NOTE — DISCUSSION/SUMMARY
[FreeTextEntry1] : 69-year-old female with PAF s/p ablation 11/26/19 at [facility] and HLD presents for followup.  Patient was last seen on 12/6/19.  She is on Eliquis 5 mg BID and Metoprolol ER 25 mg.  She is off Multaq 400 mg BID.  \par \par Patient reports that she tends to wake up at 6 am from palpitations lasting 1 minute.  Patient denies palpitations at other times.  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   Patient wore a 7-day Ziopatch and it showed short runs of PAT (5 beats the longest). No PAF was noted.  Patient was reassured.  Her [id number] score is 2 (age 65-75, female gender).  I advised patient to continue Eliquis 5 mg BID for stroke prevention and Metoprolol ER 25 mg for now.  She is off Multaq 400 mg BID.\par \par (2) Mild CAD - Patient reports that she had a CTA in [facility] which showed mild CAD (<50% stenosis).  She is asymptomatic.  She is on statin therapy.\par \par (3) Followup -  6 months.

## 2020-08-18 NOTE — HISTORY OF PRESENT ILLNESS
[FreeTextEntry1] : 69-year-old female with PAF s/p ablation 11/26/19 at [facility] and D presents for followup.  Patient was last seen on 2/24/20.  She is on Eliquis 5 mg BID and Metoprolol ER 25 mg.  She is off Multaq 400 mg BID.  \par \par ------------------------------------------------------------------------------------------------------------- \par previous visit summary:\par \par (1) Paroxysmal atrial flutter/fibrillation s/p GABBY and cardioversion s/p ablation 11/26/19 at [facility] - Patient reports palpitations.   Patient wore a 7-day Ziopatch and it showed short runs of PAT (5 beats the longest). No PAF was noted.  Patient was reassured.  Her [id number] score is 2 (age 65-75, female gender).  I advised patient to continue Eliquis 5 mg BID for stroke prevention and Metoprolol ER 25 mg for now.  She is off Multaq 400 mg BID.\par \par (2) Mild CAD - Patient reports that she had a CTA in [facility] which showed mild CAD (<50% stenosis).  She is asymptomatic.  She is on statin therapy.\par \par (3) Followup -  6 months.\par \par Old note - \par \par Patient reports that she tends to wake up at 6 am from palpitations lasting 1 minute.  Patient denies palpitations at other times.  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

## 2020-08-18 NOTE — REASON FOR VISIT
[Follow-Up - Clinic] : a clinic follow-up of [Atrial Fibrillation] : atrial fibrillation [Palpitations] : palpitations [FreeTextEntry1] : Patient reports that she still has palpitations lasting seconds in the morning. She is out of Eliquis and skipped for the last 2-3 days. Patient is not on Multaq. She is on statins. Patient has symptoms of sleep apnea. Patient denies bleeding issues. Patient denies CP. Patient denies SOB.

## 2020-12-09 ENCOUNTER — APPOINTMENT (OUTPATIENT)
Dept: HEART AND VASCULAR | Facility: CLINIC | Age: 70
End: 2020-12-09
Payer: MEDICARE

## 2020-12-09 ENCOUNTER — NON-APPOINTMENT (OUTPATIENT)
Age: 70
End: 2020-12-09

## 2020-12-09 VITALS
HEART RATE: 77 BPM | TEMPERATURE: 97.7 F | WEIGHT: 110 LBS | DIASTOLIC BLOOD PRESSURE: 65 MMHG | BODY MASS INDEX: 20.12 KG/M2 | SYSTOLIC BLOOD PRESSURE: 124 MMHG

## 2020-12-09 PROCEDURE — 99072 ADDL SUPL MATRL&STAF TM PHE: CPT

## 2020-12-09 PROCEDURE — 93000 ELECTROCARDIOGRAM COMPLETE: CPT

## 2020-12-09 PROCEDURE — 99213 OFFICE O/P EST LOW 20 MIN: CPT | Mod: 25

## 2020-12-14 NOTE — PHYSICAL EXAM
[General Appearance - Well Developed] : well developed [Normal Appearance] : normal appearance [Well Groomed] : well groomed [General Appearance - Well Nourished] : well nourished [No Deformities] : no deformities [General Appearance - In No Acute Distress] : no acute distress [Normal Conjunctiva] : the conjunctiva exhibited no abnormalities [Normal Oral Mucosa] : normal oral mucosa [Normal Jugular Venous V Waves Present] : normal jugular venous V waves present [Respiration, Rhythm And Depth] : normal respiratory rhythm and effort [Exaggerated Use Of Accessory Muscles For Inspiration] : no accessory muscle use [Auscultation Breath Sounds / Voice Sounds] : lungs were clear to auscultation bilaterally [Abnormal Walk] : normal gait [Skin Color & Pigmentation] : normal skin color and pigmentation [Oriented To Time, Place, And Person] : oriented to person, place, and time [Impaired Insight] : insight and judgment were intact [Affect] : the affect was normal [Mood] : the mood was normal [No Anxiety] : not feeling anxious [5th Left ICS - MCL] : palpated at the 5th LICS in the midclavicular line [Normal] : normal [Normal Rate] : normal [Rhythm Regular] : regular [Normal S1] : normal S1 [Normal S2] : normal S2 [No Pitting Edema] : no pitting edema present [] : no ischemic changes [S3] : no S3 [S4] : no S4

## 2020-12-14 NOTE — DISCUSSION/SUMMARY
[FreeTextEntry1] : Ms. Gómez is a 70 year old female with atrial fibrillation (failed Multaq), now s/p ablation 11/2019 who presents for follow up.  She is doing well post ablation with no symptoms concerning for recurrent sustained arrhythmia.  She is maintained on oral anticoagulation.  She will follow up in 6 months or sooner if needed and knows to call with any questions or concerns.

## 2020-12-14 NOTE — REVIEW OF SYSTEMS
[see HPI] : see HPI [Negative] : Heme/Lymph [Fever] : no fever [Recent Weight Gain (___ Lbs)] : no recent weight gain [Chills] : no chills [Feeling Fatigued] : not feeling fatigued [Shortness Of Breath] : no shortness of breath [Chest Pain] : no chest pain

## 2020-12-14 NOTE — HISTORY OF PRESENT ILLNESS
[FreeTextEntry1] : Ms. Gómez is a 70 year old female with atrial fibrillation (failed Multaq), now s/p ablation 11/2019 who presents for follow up \par \par She has had paroxysmal atrial fibrillation for a few years.  She was started on Multaq but had breakthrough palpitations and increased fatigue.  She ultimately underwent an ablation 11/2019.  She is doing well since then.  She states sometimes she wakes up at night and has palpitations lasting seconds to a minute.  This goes away without intervention.   No chest pain, SOB, syncope, near syncope.   \par \par

## 2021-03-15 ENCOUNTER — APPOINTMENT (OUTPATIENT)
Dept: CARDIOLOGY | Facility: CLINIC | Age: 71
End: 2021-03-15
Payer: MEDICARE

## 2021-03-15 VITALS
TEMPERATURE: 97.7 F | RESPIRATION RATE: 17 BRPM | SYSTOLIC BLOOD PRESSURE: 119 MMHG | OXYGEN SATURATION: 97 % | BODY MASS INDEX: 20.85 KG/M2 | HEART RATE: 89 BPM | DIASTOLIC BLOOD PRESSURE: 79 MMHG | WEIGHT: 114 LBS

## 2021-03-15 PROCEDURE — 99072 ADDL SUPL MATRL&STAF TM PHE: CPT

## 2021-03-15 PROCEDURE — 99214 OFFICE O/P EST MOD 30 MIN: CPT

## 2021-03-31 ENCOUNTER — APPOINTMENT (OUTPATIENT)
Dept: HEART AND VASCULAR | Facility: CLINIC | Age: 71
End: 2021-03-31
Payer: MEDICARE

## 2021-03-31 ENCOUNTER — NON-APPOINTMENT (OUTPATIENT)
Age: 71
End: 2021-03-31

## 2021-03-31 VITALS
HEART RATE: 77 BPM | HEIGHT: 62 IN | BODY MASS INDEX: 20.98 KG/M2 | WEIGHT: 114 LBS | SYSTOLIC BLOOD PRESSURE: 122 MMHG | DIASTOLIC BLOOD PRESSURE: 66 MMHG

## 2021-03-31 DIAGNOSIS — I48.91 UNSPECIFIED ATRIAL FIBRILLATION: ICD-10-CM

## 2021-03-31 PROCEDURE — 99072 ADDL SUPL MATRL&STAF TM PHE: CPT

## 2021-03-31 PROCEDURE — 93000 ELECTROCARDIOGRAM COMPLETE: CPT

## 2021-03-31 PROCEDURE — 99213 OFFICE O/P EST LOW 20 MIN: CPT | Mod: 25

## 2021-03-31 NOTE — HISTORY OF PRESENT ILLNESS
[FreeTextEntry1] : Ms. Gómez is a 70 year old female with atrial fibrillation (failed Multaq), now s/p ablation 11/2019 who presents for follow up \par \par She has had paroxysmal atrial fibrillation for a few years.  She was started on Multaq but had breakthrough palpitations and increased fatigue.  She ultimately underwent an ablation 11/2019.  She is doing well since then.  \par \par She continues to c/o palpitations that wake her up from sleep at 6am almost every day. They last a seconds to a minute and stop on their own. She's worn a monitor for this which shows brief 5 beat PAT but not c/w timing of symptoms.    \par \par No chest pain, SOB, syncope, near syncope.   \par \par

## 2021-03-31 NOTE — PHYSICAL EXAM
[General Appearance - Well Developed] : well developed [Normal Appearance] : normal appearance [Well Groomed] : well groomed [General Appearance - Well Nourished] : well nourished [No Deformities] : no deformities [General Appearance - In No Acute Distress] : no acute distress [Normal Conjunctiva] : the conjunctiva exhibited no abnormalities [Normal Oral Mucosa] : normal oral mucosa [Normal Jugular Venous V Waves Present] : normal jugular venous V waves present [Respiration, Rhythm And Depth] : normal respiratory rhythm and effort [Exaggerated Use Of Accessory Muscles For Inspiration] : no accessory muscle use [Auscultation Breath Sounds / Voice Sounds] : lungs were clear to auscultation bilaterally [Abnormal Walk] : normal gait [] : no ischemic changes [Skin Color & Pigmentation] : normal skin color and pigmentation [Oriented To Time, Place, And Person] : oriented to person, place, and time [Impaired Insight] : insight and judgment were intact [Affect] : the affect was normal [Mood] : the mood was normal [No Anxiety] : not feeling anxious [5th Left ICS - MCL] : palpated at the 5th LICS in the midclavicular line [Normal] : normal [Normal Rate] : normal [Rhythm Regular] : regular [Normal S1] : normal S1 [Normal S2] : normal S2 [No Pitting Edema] : no pitting edema present [S3] : no S3 [S4] : no S4

## 2021-04-13 ENCOUNTER — APPOINTMENT (OUTPATIENT)
Dept: HEART AND VASCULAR | Facility: CLINIC | Age: 71
End: 2021-04-13
Payer: MEDICARE

## 2021-04-13 PROCEDURE — 99072 ADDL SUPL MATRL&STAF TM PHE: CPT

## 2021-04-13 PROCEDURE — 93248 EXT ECG>7D<15D REV&INTERPJ: CPT

## 2021-05-03 NOTE — DISCUSSION/SUMMARY
[FreeTextEntry1] : 69-year-old female with PAF s/p ablation 11/26/19 at [facility] and [facility] presents for followup.  Patient was last seen on 12/6/19.  She is on Eliquis 5 mg BID and Metoprolol ER 25 mg.  She is off Multaq 400 mg BID.  \par \par Patient reports that she tends to wake up at 6 am from palpitations lasting 1 minute.  Patient denies palpitations at other times.  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   Patient wore a 7-day Ziopatch and it showed short runs of PAT (5 beats the longest). No PAF was noted.  Patient was reassured.  Her [id number] score is 2 (age 65-75, female gender).  I advised patient to continue Eliquis 5 mg BID for stroke prevention and Metoprolol ER 25 mg for now.  She is off Multaq 400 mg BID.\par \par (2) Mild CAD - Patient reports that she had a CTA in [facility] which showed mild CAD (<50% stenosis).  She is asymptomatic.  She is on statin therapy.\par \par (3) Followup -  6 months.

## 2021-05-03 NOTE — REASON FOR VISIT
[Follow-Up - Clinic] : a clinic follow-up of [Atrial Fibrillation] : atrial fibrillation [Palpitations] : palpitations [FreeTextEntry1] : 3/15/21 - Patient reports palpitations during her sleep that wakes her up. She decided to take her Metoprolol ER 25 mg at night. Patient reports that she feels okay during the day. I advised patient that since her Metoprolol is ER she could switch to taking Metoprolol 25 mg 2 tablet at bedtime instead to help with her symptoms. Patient denies CP, SOB, or lightheadedness. FU in 6 months. \par \par \par \par 8/10/20 - Patient reports that she still has palpitations lasting seconds in the morning. She is out of Eliquis and skipped for the last 2-3 days. Patient is not on Multaq. She is on statins. Patient has symptoms of sleep apnea. Patient denies bleeding issues. Patient denies CP. Patient denies SOB.

## 2021-05-03 NOTE — HISTORY OF PRESENT ILLNESS
[FreeTextEntry1] : 69-year-old female with PAF s/p ablation 11/26/19 at [facility] and [facility] presents for followup.  \par \par Patient was last seen on 8/10/20.  She is on Eliquis 5 mg BID and Metoprolol ER 25 mg.  She is off Multaq 400 mg BID. \par \par Last echo was on 11/1/19 at [facility] and it showed normal LV function without significant valvular pathology.

## 2021-07-12 NOTE — HISTORY OF PRESENT ILLNESS
[FreeTextEntry1] : 71-year-old female with PAF s/p ablation 11/26/19 at [facility] and [facility] presents for followup.  \par \par Patient was last seen on 3/15/21 for palpitations at night.  I advised patient to switch Metoprolol ER 25 mg to Metoprolol tartrate 25 mg at bedtime.  Dose was increased by EP to 50 mg at bedtime.\par \par  She is on Eliquis 5 mg BID and Metoprolol 50 mg at bedtime.  She is off Multaq 400 mg BID. \par \par Last echo was on 11/1/19 at [facility] and it showed normal LV function without significant valvular pathology.

## 2021-07-12 NOTE — REASON FOR VISIT
[Symptom and Test Evaluation] : symptom and test evaluation [FreeTextEntry1] : 3/15/21 - Patient reports palpitations during her sleep that wakes her up. She decided to take her Metoprolol ER 25 mg at night. Patient reports that she feels okay during the day. I advised patient that since her Metoprolol is ER she could switch to taking Metoprolol 25 mg 2 tablet at bedtime instead to help with her symptoms. Patient denies CP, SOB, or lightheadedness. FU in 6 months. \par \par 8/10/20 - Patient reports that she still has palpitations lasting seconds in the morning. She is out of Eliquis and skipped for the last 2-3 days. Patient is not on Multaq. She is on statins. Patient has symptoms of sleep apnea. Patient denies bleeding issues. Patient denies CP. Patient denies SOB.

## 2021-07-12 NOTE — DISCUSSION/SUMMARY
[FreeTextEntry1] : 69-year-old female with PAF s/p ablation 11/26/19 at [facility] and [facility] presents for followup.  Patient was last seen on 12/6/19.  She is on Eliquis 5 mg BID and Metoprolol ER 25 mg.  She is off Multaq 400 mg BID.  \par \par Patient reports that she tends to wake up at 6 am from palpitations lasting 1 minute.  Patient denies palpitations at other times.  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 - Patient reports palpitations.   Patient wore a 7-day Ziopatch and it showed short runs of PAT (5 beats the longest). No PAF was noted.  Patient was reassured.  Her [id number] score is 2 (age 65-75, female gender).  I advised patient to continue Eliquis 5 mg BID for stroke prevention and Metoprolol ER 25 mg for now.  She is off Multaq 400 mg BID.\par \par (2) Mild CAD - Patient reports that she had a CTA in [facility] which showed mild CAD (<50% stenosis).  She is asymptomatic.  She is on statin therapy.\par \par (3) Followup -  6 months.

## 2021-07-13 ENCOUNTER — APPOINTMENT (OUTPATIENT)
Dept: CARDIOLOGY | Facility: CLINIC | Age: 71
End: 2021-07-13
Payer: MEDICARE

## 2021-07-13 ENCOUNTER — NON-APPOINTMENT (OUTPATIENT)
Age: 71
End: 2021-07-13

## 2021-07-13 VITALS
TEMPERATURE: 98.1 F | WEIGHT: 111 LBS | HEART RATE: 78 BPM | SYSTOLIC BLOOD PRESSURE: 110 MMHG | OXYGEN SATURATION: 96 % | BODY MASS INDEX: 20.3 KG/M2 | RESPIRATION RATE: 17 BRPM | DIASTOLIC BLOOD PRESSURE: 70 MMHG

## 2021-07-13 DIAGNOSIS — R06.02 SHORTNESS OF BREATH: ICD-10-CM

## 2021-07-13 PROCEDURE — 99214 OFFICE O/P EST MOD 30 MIN: CPT | Mod: 25

## 2021-07-13 PROCEDURE — 93306 TTE W/DOPPLER COMPLETE: CPT

## 2021-07-13 PROCEDURE — 99072 ADDL SUPL MATRL&STAF TM PHE: CPT

## 2021-07-13 PROCEDURE — 93000 ELECTROCARDIOGRAM COMPLETE: CPT | Mod: 59

## 2021-07-13 PROCEDURE — 93015 CV STRESS TEST SUPVJ I&R: CPT

## 2022-03-21 ENCOUNTER — APPOINTMENT (OUTPATIENT)
Dept: CARDIOLOGY | Facility: CLINIC | Age: 72
End: 2022-03-21

## 2022-04-18 ENCOUNTER — APPOINTMENT (OUTPATIENT)
Dept: CARDIOLOGY | Facility: CLINIC | Age: 72
End: 2022-04-18
Payer: MEDICARE

## 2022-04-19 ENCOUNTER — APPOINTMENT (OUTPATIENT)
Dept: CARDIOLOGY | Facility: CLINIC | Age: 72
End: 2022-04-19
Payer: MEDICARE

## 2022-04-19 VITALS
WEIGHT: 114 LBS | OXYGEN SATURATION: 94 % | SYSTOLIC BLOOD PRESSURE: 109 MMHG | TEMPERATURE: 97.8 F | HEART RATE: 76 BPM | RESPIRATION RATE: 18 BRPM | DIASTOLIC BLOOD PRESSURE: 68 MMHG | BODY MASS INDEX: 20.85 KG/M2

## 2022-04-19 PROCEDURE — 93000 ELECTROCARDIOGRAM COMPLETE: CPT

## 2022-04-19 PROCEDURE — 99214 OFFICE O/P EST MOD 30 MIN: CPT

## 2022-04-19 RX ORDER — DONEPEZIL HYDROCHLORIDE 5 MG/1
5 TABLET ORAL
Qty: 90 | Refills: 0 | Status: ACTIVE | COMMUNITY
Start: 2022-02-18

## 2022-04-20 NOTE — REASON FOR VISIT
[FreeTextEntry1] : 71-year-old female with PAF s/p ablation 11/26/19 at [facility] and D presents for followup.  \par \par Patient was last seen on 7/13/21 for SOB and palpitations.  Patient underwent a CXR and it showed no lung pathology.  Patient underwent an echocardiogram and it showed normal LV function without significant valvular pathology. Patient underwent a treadmill stress test and completed 5 minutes of Franklyn protocol.  There were upsloping ST depressions on ECG but no symptoms.  Following treadmill stress, there was no echocardiographic evidence of ischemia.  \par \par She is on Eliquis 5 mg BID and Metoprolol tartrate 25 mg at bedtime.  She is off Multaq 400 mg BID. \par \par

## 2022-04-20 NOTE — HISTORY OF PRESENT ILLNESS
[FreeTextEntry1] : 4/19/22 - Patient reports palpitations lasting seconds. Patient reports feeling stress. Patient denies CP, SOB, or lightheadedness. She is on Eliquis 5 mg BID and Metoprolol tartrate 25 mg BID.  She is still taking Multaq 400 mg BID prescribed by PCP. Patient is supposed to stop Multaq if her ablation is successful. Patient is very confused about her medications.She may not be on Eliquis any more. Pharmacy cannot confirm.  Patient will bring her medications back for clarifications. \par \par 7/13/21 - Patient reports palpitations around 6-7 AM when waking up, lasting 2-3 minutes.  Patient denies CP.  Patient reports SOB with stairs.  Patient underwent a CXR and it showed no lung pathology.  Patient underwent an echocardiogram and it showed normal LV function without significant valvular pathology. Patient underwent a treadmill stress test and completed 5 minutes of Franklyn protocol.  There were upsloping ST depressions on ECG but no symptoms.  Following treadmill stress, there was no echocardiographic evidence of ischemia.  \par \par 3/15/21 - Patient reports palpitations during her sleep that wakes her up. She decided to take her Metoprolol ER 25 mg at night. Patient reports that she feels okay during the day. I advised patient that since her Metoprolol is ER she could switch to taking Metoprolol 25 mg 2 tablet at bedtime instead to help with her symptoms. Patient denies CP, SOB, or lightheadedness. FU in 6 months. \par \par 8/10/20 - Patient reports that she still has palpitations lasting seconds in the morning. She is out of Eliquis and skipped for the last 2-3 days. Patient is not on Multaq. She is on statins. Patient has symptoms of sleep apnea. Patient denies bleeding issues. Patient denies CP. Patient denies SOB.

## 2022-04-20 NOTE — CARDIOLOGY SUMMARY
[de-identified] : Echo 11/1/19 at [facility] showed normal LV function without significant valvular pathology.

## 2022-05-13 ENCOUNTER — APPOINTMENT (OUTPATIENT)
Dept: CARDIOLOGY | Facility: CLINIC | Age: 72
End: 2022-05-13
Payer: MEDICARE

## 2022-05-16 ENCOUNTER — APPOINTMENT (OUTPATIENT)
Dept: CARDIOLOGY | Facility: CLINIC | Age: 72
End: 2022-05-16
Payer: MEDICARE

## 2022-05-16 VITALS
RESPIRATION RATE: 18 BRPM | WEIGHT: 110 LBS | DIASTOLIC BLOOD PRESSURE: 75 MMHG | SYSTOLIC BLOOD PRESSURE: 107 MMHG | TEMPERATURE: 97.6 F | BODY MASS INDEX: 20.12 KG/M2 | OXYGEN SATURATION: 95 % | HEART RATE: 87 BPM

## 2022-05-16 PROCEDURE — 99213 OFFICE O/P EST LOW 20 MIN: CPT

## 2022-05-16 RX ORDER — METOPROLOL TARTRATE 25 MG/1
25 TABLET, FILM COATED ORAL
Qty: 90 | Refills: 1 | Status: ACTIVE | COMMUNITY
Start: 2021-03-15 | End: 1900-01-01

## 2022-05-30 NOTE — CARDIOLOGY SUMMARY
[de-identified] : Echo 11/1/19 at [facility] showed normal LV function without significant valvular pathology.

## 2022-05-30 NOTE — HISTORY OF PRESENT ILLNESS
[FreeTextEntry1] : 5/16/22- Patient reports palpitations lasting seconds and feels better after taking deep breath. Patient reports waking up in the middle of the night due to sleep apnea. I advised patient to continue on Eliquis 5 mg BID and may continue on Multaq.  Patient may take Metoprolol tartrate 25 mg at bed time to help with her sleep at night. \par \par 4/19/22 - Patient reports palpitations lasting seconds. Patient reports feeling stress. Patient denies CP, SOB, or lightheadedness. She is on Eliquis 5 mg BID and Metoprolol tartrate 25 mg BID.  She is still taking Multaq 400 mg BID prescribed by PCP. Patient is supposed to stop Multaq if her ablation is successful. Patient is very confused about her medications.She may not be on Eliquis any more. Pharmacy cannot confirm.  Patient will bring her medications back for clarifications. \par \par 7/13/21 - Patient reports palpitations around 6-7 AM when waking up, lasting 2-3 minutes.  Patient denies CP.  Patient reports SOB with stairs.  Patient underwent a CXR and it showed no lung pathology.  Patient underwent an echocardiogram and it showed normal LV function without significant valvular pathology. Patient underwent a treadmill stress test and completed 5 minutes of Franklyn protocol.  There were upsloping ST depressions on ECG but no symptoms.  Following treadmill stress, there was no echocardiographic evidence of ischemia.  \par \par 3/15/21 - Patient reports palpitations during her sleep that wakes her up. She decided to take her Metoprolol ER 25 mg at night. Patient reports that she feels okay during the day. I advised patient that since her Metoprolol is ER she could switch to taking Metoprolol 25 mg 2 tablet at bedtime instead to help with her symptoms. Patient denies CP, SOB, or lightheadedness. FU in 6 months. \par \par 8/10/20 - Patient reports that she still has palpitations lasting seconds in the morning. She is out of Eliquis and skipped for the last 2-3 days. Patient is not on Multaq. She is on statins. Patient has symptoms of sleep apnea. Patient denies bleeding issues. Patient denies CP. Patient denies SOB.

## 2022-05-30 NOTE — REASON FOR VISIT
[FreeTextEntry1] : 71-year-old female with PAF s/p ablation 11/26/19 at [facility] and D presents for followup.  \par \par Patient was last seen on  4/19/22 for palpitations lasting seconds.  She was still taking Multaq 400 mg BID prescribed by PCP.  I advised patient to stop Multaq 400 mg BID to see if her ablation was successful.\par \par She is on Eliquis 5 mg BID and Metoprolol tartrate 25 mg BID (?).  She is off Multaq 400 mg BID. \par \par Patient underwent a CXR 7/13/21 and it showed no lung pathology.  \par \par Patient underwent an echocardiogram 7/13/21 and it showed normal LV function without significant valvular pathology. \par \par Patient underwent a treadmill stress test 7/13/21 and completed 5 minutes of Franklyn protocol.  There were upsloping ST depressions on ECG but no symptoms.  Following treadmill stress, there was no echocardiographic evidence of ischemia.

## 2023-02-01 NOTE — PATIENT PROFILE ADULT - NSPROHMCARDIOMGMTSTRAT_GEN_A_NUR
medication therapy/s/p ablation Skyri[place] Counseling: I discussed with the patient the risks of risankizumab-rzaa including but not limited to immunosuppression, and serious infections.  The patient understands that monitoring is required including a PPD at baseline and must alert us or the primary physician if symptoms of infection or other concerning signs are noted.

## 2023-07-20 RX ORDER — APIXABAN 5 MG/1
5 TABLET, FILM COATED ORAL
Qty: 60 | Refills: 0 | Status: ACTIVE | COMMUNITY
Start: 2018-02-14 | End: 1900-01-01

## 2023-08-31 ENCOUNTER — APPOINTMENT (OUTPATIENT)
Dept: CARDIOLOGY | Facility: CLINIC | Age: 73
End: 2023-08-31
Payer: MEDICARE

## 2023-08-31 ENCOUNTER — NON-APPOINTMENT (OUTPATIENT)
Age: 73
End: 2023-08-31

## 2023-08-31 VITALS
DIASTOLIC BLOOD PRESSURE: 76 MMHG | OXYGEN SATURATION: 94 % | WEIGHT: 102 LBS | BODY MASS INDEX: 18.66 KG/M2 | SYSTOLIC BLOOD PRESSURE: 114 MMHG | RESPIRATION RATE: 16 BRPM | HEART RATE: 62 BPM | TEMPERATURE: 97.8 F

## 2023-08-31 DIAGNOSIS — Z98.890 OTHER SPECIFIED POSTPROCEDURAL STATES: ICD-10-CM

## 2023-08-31 DIAGNOSIS — R00.2 PALPITATIONS: ICD-10-CM

## 2023-08-31 DIAGNOSIS — Z86.79 OTHER SPECIFIED POSTPROCEDURAL STATES: ICD-10-CM

## 2023-08-31 DIAGNOSIS — I48.0 PAROXYSMAL ATRIAL FIBRILLATION: ICD-10-CM

## 2023-08-31 PROCEDURE — 93000 ELECTROCARDIOGRAM COMPLETE: CPT

## 2023-08-31 PROCEDURE — 99214 OFFICE O/P EST MOD 30 MIN: CPT | Mod: 25

## 2023-08-31 NOTE — CARDIOLOGY SUMMARY
[de-identified] : Echo 11/1/19 at [facility] showed normal LV function without significant valvular pathology. [___] : [unfilled]

## 2023-08-31 NOTE — REASON FOR VISIT
[FreeTextEntry1] : 71-year-old female with PAF s/p ablation 11/26/19 at [facility] and D presents for followup.    Patient was last seen on  5/16/22 - Patient reports palpitations lasting seconds and feels better after taking deep breath. Patient reports waking up in the middle of the night due to sleep apnea. I advised patient to continue on Eliquis 5 mg BID and may continue on Multaq. Patient may take Metoprolol tartrate 25 mg at bedtime to help with her sleep at night.  She is on Eliquis 5 mg BID and Metoprolol tartrate 25 mg BID (?).  She is ? Multaq 400 mg BID.   Patient underwent a CXR 7/13/21 and it showed no lung pathology.    Patient underwent an echocardiogram 7/13/21 and it showed normal LV function without significant valvular pathology.   Patient underwent a treadmill stress test 7/13/21 and completed 5 minutes of Franklyn protocol.  There were upsloping ST depressions on ECG but no symptoms.  Following treadmill stress, there was no echocardiographic evidence of ischemia.

## 2024-08-06 ENCOUNTER — APPOINTMENT (OUTPATIENT)
Dept: CARDIOLOGY | Facility: CLINIC | Age: 74
End: 2024-08-06

## 2024-08-06 ENCOUNTER — NON-APPOINTMENT (OUTPATIENT)
Age: 74
End: 2024-08-06

## 2024-08-06 PROCEDURE — 99214 OFFICE O/P EST MOD 30 MIN: CPT | Mod: 25

## 2024-08-06 PROCEDURE — 93306 TTE W/DOPPLER COMPLETE: CPT

## 2024-08-06 PROCEDURE — 93000 ELECTROCARDIOGRAM COMPLETE: CPT | Mod: 59

## 2024-08-06 NOTE — CARDIOLOGY SUMMARY
[___] : [unfilled] [de-identified] : Echo 11/1/19 at [facility] showed normal LV function without significant valvular pathology.

## 2024-08-06 NOTE — REASON FOR VISIT
[FreeTextEntry1] : 74 year-old female with PAF s/p ablation 11/26/19 at [facility] and D presents for followup.    Patient was last seen on  5/16/22 - Patient reports palpitations lasting seconds and feels better after taking deep breath. Patient reports waking up in the middle of the night due to sleep apnea. I advised patient to continue on Eliquis 5 mg BID and may continue on Multaq. Patient may take Metoprolol tartrate 25 mg at bedtime to help with her sleep at night.  She is OFF Eliquis 5 mg BID, OFF Metoprolol tartrate 25 mg BID.  She is OFF Multaq 400 mg BID.   Patient underwent a CXR 7/13/21 and it showed no lung pathology.    Patient underwent an echocardiogram 7/13/21 and it showed normal LV function without significant valvular pathology.   Patient underwent a treadmill stress test 7/13/21 and completed 5 minutes of Franklyn protocol.  There were upsloping ST depressions on ECG but no symptoms.  Following treadmill stress, there was no echocardiographic evidence of ischemia.

## 2024-08-06 NOTE — HISTORY OF PRESENT ILLNESS
[FreeTextEntry1] : 8/6/24 - Patient reports occasional palpitations.  Patient denies CP or SOB.  She is currently off Eliquis and Metoprolol.  /70 HR 75.  Exam unremarkable.  ECG showed no ischemic changes.  I advised patient to undergo an echocardiogram.  I advised patient to wear a 7-day event monitor.  I advised patient to resume Eliquis 5 mg BID and Metoprolol ER 25 mg.  8/31/23 - Patient reports occasional palpitations lasting seconds.  Patient denies CP or SOB.  Patient denies h/o syncope.  ECG showed no ischemic changes.  She is on Eliquis 5 mg BID, Metoprolol tartrate 25 mg BID.    5/16/22- Patient reports palpitations lasting seconds and feels better after taking deep breath. Patient reports waking up in the middle of the night due to sleep apnea. I advised patient to continue on Eliquis 5 mg BID and may continue on Multaq.  Patient may take Metoprolol tartrate 25 mg at bed time to help with her sleep at night.   4/19/22 - Patient reports palpitations lasting seconds. Patient reports feeling stress. Patient denies CP, SOB, or lightheadedness. She is on Eliquis 5 mg BID and Metoprolol tartrate 25 mg BID.  She is still taking Multaq 400 mg BID prescribed by PCP. Patient is supposed to stop Multaq if her ablation is successful. Patient is very confused about her medications.She may not be on Eliquis any more. Pharmacy cannot confirm.  Patient will bring her medications back for clarifications.   7/13/21 - Patient reports palpitations around 6-7 AM when waking up, lasting 2-3 minutes.  Patient denies CP.  Patient reports SOB with stairs.  Patient underwent a CXR and it showed no lung pathology.  Patient underwent an echocardiogram and it showed normal LV function without significant valvular pathology. Patient underwent a treadmill stress test and completed 5 minutes of Franklyn protocol.  There were upsloping ST depressions on ECG but no symptoms.  Following treadmill stress, there was no echocardiographic evidence of ischemia.    3/15/21 - Patient reports palpitations during her sleep that wakes her up. She decided to take her Metoprolol ER 25 mg at night. Patient reports that she feels okay during the day. I advised patient that since her Metoprolol is ER she could switch to taking Metoprolol 25 mg 2 tablet at bedtime instead to help with her symptoms. Patient denies CP, SOB, or lightheadedness. FU in 6 months.   8/10/20 - Patient reports that she still has palpitations lasting seconds in the morning. She is out of Eliquis and skipped for the last 2-3 days. Patient is not on Multaq. She is on statins. Patient has symptoms of sleep apnea. Patient denies bleeding issues. Patient denies CP. Patient denies SOB.

## 2024-08-22 ENCOUNTER — NON-APPOINTMENT (OUTPATIENT)
Age: 74
End: 2024-08-22